# Patient Record
Sex: MALE | Race: OTHER | Employment: FULL TIME | URBAN - METROPOLITAN AREA
[De-identification: names, ages, dates, MRNs, and addresses within clinical notes are randomized per-mention and may not be internally consistent; named-entity substitution may affect disease eponyms.]

---

## 2017-11-09 ENCOUNTER — LAB ENCOUNTER (OUTPATIENT)
Dept: LAB | Age: 42
End: 2017-11-09
Attending: FAMILY MEDICINE
Payer: COMMERCIAL

## 2017-11-09 ENCOUNTER — HOSPITAL ENCOUNTER (OUTPATIENT)
Dept: GENERAL RADIOLOGY | Age: 42
Discharge: HOME | End: 2017-11-09
Attending: FAMILY MEDICINE
Payer: COMMERCIAL

## 2017-11-09 ENCOUNTER — OFFICE VISIT (OUTPATIENT)
Dept: FAMILY MEDICINE CLINIC | Facility: CLINIC | Age: 42
End: 2017-11-09

## 2017-11-09 VITALS
TEMPERATURE: 97 F | BODY MASS INDEX: 30 KG/M2 | WEIGHT: 196 LBS | HEART RATE: 81 BPM | SYSTOLIC BLOOD PRESSURE: 121 MMHG | DIASTOLIC BLOOD PRESSURE: 79 MMHG

## 2017-11-09 DIAGNOSIS — M54.50 CHRONIC BILATERAL LOW BACK PAIN WITHOUT SCIATICA: ICD-10-CM

## 2017-11-09 DIAGNOSIS — G89.29 CHRONIC BILATERAL LOW BACK PAIN WITHOUT SCIATICA: ICD-10-CM

## 2017-11-09 DIAGNOSIS — R10.13 EPIGASTRIC ABDOMINAL PAIN: Primary | ICD-10-CM

## 2017-11-09 DIAGNOSIS — R09.89 RALES: ICD-10-CM

## 2017-11-09 DIAGNOSIS — R19.7 DIARRHEA, UNSPECIFIED TYPE: ICD-10-CM

## 2017-11-09 DIAGNOSIS — R09.89 RHONCHI: ICD-10-CM

## 2017-11-09 DIAGNOSIS — L29.0 PRURITUS ANI: ICD-10-CM

## 2017-11-09 PROCEDURE — 99215 OFFICE O/P EST HI 40 MIN: CPT | Performed by: FAMILY MEDICINE

## 2017-11-09 PROCEDURE — 85652 RBC SED RATE AUTOMATED: CPT

## 2017-11-09 PROCEDURE — 99212 OFFICE O/P EST SF 10 MIN: CPT | Performed by: FAMILY MEDICINE

## 2017-11-09 PROCEDURE — 72110 X-RAY EXAM L-2 SPINE 4/>VWS: CPT | Performed by: FAMILY MEDICINE

## 2017-11-09 PROCEDURE — 80048 BASIC METABOLIC PNL TOTAL CA: CPT

## 2017-11-09 PROCEDURE — 85025 COMPLETE CBC W/AUTO DIFF WBC: CPT

## 2017-11-09 PROCEDURE — 36415 COLL VENOUS BLD VENIPUNCTURE: CPT

## 2017-11-09 RX ORDER — PANTOPRAZOLE SODIUM 40 MG/1
40 TABLET, DELAYED RELEASE ORAL
Qty: 90 TABLET | Refills: 1 | Status: SHIPPED | OUTPATIENT
Start: 2017-11-09 | End: 2018-11-09

## 2017-11-09 NOTE — PROGRESS NOTES
Patient ID: Sweetie Griffin is a 38year old male. HPI  Patient presents with:  Abdominal Pain: X 1 week  Diarrhea: X 2 weeks off and on     He has had epigastric pain for 1 week.   He states that given his wife some diclofenac and he was compla Readings from Last 6 Encounters:  11/09/17 : 121/79  11/01/16 : 113/76  02/01/16 : 132/82  01/02/15 : 120/58  12/22/14 : 122/80  12/12/14 : 123/82        Review of Systems   Constitutional: Negative for appetite change and unexpected weight change.    Respi Psychiatric: Patient has a normal mood and affect. He is actually coping quite well after losing his home. BACK: Has mild tenderness over the spinous processes from L3 down to L5 but no crepitus. No tenderness over the paraspinal muscles.     Deep ten CHEST (CPT=71020); Future        Follow up if symptoms persist.  Take medicine (if given) as prescribed. Approach to treatment discussed and patient/family member understands and agrees to plan.            Veda Santos DO  11/9/2017

## 2017-11-10 ENCOUNTER — HOSPITAL ENCOUNTER (OUTPATIENT)
Dept: GENERAL RADIOLOGY | Age: 42
Discharge: HOME | End: 2017-11-10
Attending: FAMILY MEDICINE
Payer: COMMERCIAL

## 2017-11-10 DIAGNOSIS — R09.89 RALES: ICD-10-CM

## 2017-11-10 DIAGNOSIS — R09.89 RHONCHI: ICD-10-CM

## 2017-11-10 PROCEDURE — 71020 XR CHEST PA + LAT CHEST (CPT=71020): CPT | Performed by: FAMILY MEDICINE

## 2017-12-20 ENCOUNTER — OFFICE VISIT (OUTPATIENT)
Dept: FAMILY MEDICINE CLINIC | Facility: CLINIC | Age: 42
End: 2017-12-20

## 2017-12-20 VITALS
HEIGHT: 68 IN | HEART RATE: 117 BPM | DIASTOLIC BLOOD PRESSURE: 68 MMHG | TEMPERATURE: 99 F | WEIGHT: 198 LBS | SYSTOLIC BLOOD PRESSURE: 126 MMHG | BODY MASS INDEX: 30.01 KG/M2

## 2017-12-20 DIAGNOSIS — J98.11 ATELECTASIS OF BOTH LUNGS: ICD-10-CM

## 2017-12-20 DIAGNOSIS — R05.9 COUGH: Primary | ICD-10-CM

## 2017-12-20 PROCEDURE — 99213 OFFICE O/P EST LOW 20 MIN: CPT | Performed by: FAMILY MEDICINE

## 2017-12-20 PROCEDURE — 99212 OFFICE O/P EST SF 10 MIN: CPT | Performed by: FAMILY MEDICINE

## 2017-12-20 RX ORDER — PREDNISONE 20 MG/1
TABLET ORAL
Qty: 10 TABLET | Refills: 0 | Status: SHIPPED | OUTPATIENT
Start: 2017-12-20 | End: 2018-11-27

## 2017-12-20 NOTE — PROGRESS NOTES
HPI:   Vicky Rodriguez is a 38year old male who presents for upper respiratory symptoms for  3  days. Patient reports congestion, cough with yellow colored sputum, headache. Also fevers and chills. Sweating a lot. Body aches.  Taking just robitus 2. Atelectasis of both lungs  Dr. Constantine Deshpande had ordered CT chest because same few months ago and CXR showed atelectasis - pt never went. Explained to pt should have it done. The patient indicates understanding of these issues and agrees to the plan.

## 2018-01-03 ENCOUNTER — HOSPITAL ENCOUNTER (OUTPATIENT)
Dept: CT IMAGING | Facility: HOSPITAL | Age: 43
Discharge: HOME | End: 2018-01-03
Attending: FAMILY MEDICINE
Payer: COMMERCIAL

## 2018-01-03 DIAGNOSIS — J98.11 ATELECTASIS OF BOTH LUNGS: ICD-10-CM

## 2018-01-03 DIAGNOSIS — R09.89 BILATERAL RALES: ICD-10-CM

## 2018-01-03 DIAGNOSIS — J98.4 SCARRING OF LUNG: ICD-10-CM

## 2018-01-03 DIAGNOSIS — R09.89 RHONCHI: ICD-10-CM

## 2018-01-03 PROCEDURE — 71250 CT THORAX DX C-: CPT | Performed by: FAMILY MEDICINE

## 2018-01-09 ENCOUNTER — TELEPHONE (OUTPATIENT)
Dept: OTHER | Age: 43
End: 2018-01-09

## 2018-01-09 NOTE — TELEPHONE ENCOUNTER
[place] call transferred by CSS: Patient informed of results (identified name and ) and recommendations, as per Dr. Toby Muir result note. Patient voiced understanding and agrees with recommendations. Pt will call to schedule testing; info provided.       Not

## 2018-01-11 ENCOUNTER — TELEPHONE (OUTPATIENT)
Dept: FAMILY MEDICINE CLINIC | Facility: CLINIC | Age: 43
End: 2018-01-11

## 2018-01-11 NOTE — TELEPHONE ENCOUNTER
----- Message from Katy South, DO sent at 1/3/2018 11:11 PM CST -----  Let them know he does have quite a bit of scarring in the lungs on both sides. I want him to do pulmonary function testing so we can see how is breathing is.

## 2018-11-27 ENCOUNTER — OFFICE VISIT (OUTPATIENT)
Dept: FAMILY MEDICINE CLINIC | Facility: CLINIC | Age: 43
End: 2018-11-27
Payer: COMMERCIAL

## 2018-11-27 VITALS
BODY MASS INDEX: 27.22 KG/M2 | WEIGHT: 179.63 LBS | TEMPERATURE: 97 F | HEIGHT: 68 IN | SYSTOLIC BLOOD PRESSURE: 110 MMHG | RESPIRATION RATE: 16 BRPM | HEART RATE: 68 BPM | DIASTOLIC BLOOD PRESSURE: 70 MMHG

## 2018-11-27 DIAGNOSIS — M54.50 ACUTE RIGHT-SIDED LOW BACK PAIN WITHOUT SCIATICA: Primary | ICD-10-CM

## 2018-11-27 DIAGNOSIS — S39.012A STRAIN OF LUMBAR REGION, INITIAL ENCOUNTER: ICD-10-CM

## 2018-11-27 PROCEDURE — 99213 OFFICE O/P EST LOW 20 MIN: CPT | Performed by: FAMILY MEDICINE

## 2018-11-27 PROCEDURE — 99212 OFFICE O/P EST SF 10 MIN: CPT | Performed by: FAMILY MEDICINE

## 2018-11-27 RX ORDER — NABUMETONE 750 MG/1
750 TABLET, FILM COATED ORAL 2 TIMES DAILY
Qty: 60 TABLET | Refills: 0 | Status: SHIPPED | OUTPATIENT
Start: 2018-11-27 | End: 2019-04-01

## 2018-11-27 RX ORDER — CYCLOBENZAPRINE HCL 10 MG
10 TABLET ORAL NIGHTLY
Qty: 20 TABLET | Refills: 0 | Status: SHIPPED | OUTPATIENT
Start: 2018-11-27 | End: 2018-12-17

## 2018-11-27 NOTE — PROGRESS NOTES
Patient ID: Matthew Zhou is a 37year old male.     HPI  Patient presents with:  Back Pain: lower back     Last night he was shoveling the snow and he put the scalpel in the snow and try to lift it and felt immediate pain just right of center in and well-nourished. No distress   Musculoskeletal:   Has tenderness over the lumbar paraspinal muscles from L2 down to L5 on the right side but also over the spinous processes in this area too.   He has decreased flexion at the waist and is only able to sherwin Approach to treatment discussed and patient/family member understands and agrees to plan. No Follow-up on file.       Aaliyah Mead,   11/27/2018

## 2018-11-27 NOTE — PATIENT INSTRUCTIONS
I see her back 2 or 3 times daily. Do stretches of your low back as well. Take the Relafen twice daily with food and then take the cyclobenzaprine at nighttime to help with sleep.

## 2019-04-01 ENCOUNTER — OFFICE VISIT (OUTPATIENT)
Dept: FAMILY MEDICINE CLINIC | Facility: CLINIC | Age: 44
End: 2019-04-01

## 2019-04-01 VITALS
BODY MASS INDEX: 27.58 KG/M2 | TEMPERATURE: 98 F | HEART RATE: 78 BPM | RESPIRATION RATE: 20 BRPM | DIASTOLIC BLOOD PRESSURE: 76 MMHG | HEIGHT: 68 IN | WEIGHT: 182 LBS | SYSTOLIC BLOOD PRESSURE: 117 MMHG

## 2019-04-01 DIAGNOSIS — M54.50 ACUTE RIGHT-SIDED LOW BACK PAIN WITHOUT SCIATICA: ICD-10-CM

## 2019-04-01 DIAGNOSIS — S39.012A STRAIN OF LUMBAR REGION, INITIAL ENCOUNTER: ICD-10-CM

## 2019-04-01 DIAGNOSIS — R09.89 BILATERAL RALES: ICD-10-CM

## 2019-04-01 DIAGNOSIS — J98.4 PULMONARY SCARRING: ICD-10-CM

## 2019-04-01 DIAGNOSIS — R93.89 ABNORMAL CT OF THE CHEST: ICD-10-CM

## 2019-04-01 DIAGNOSIS — Z00.00 ADULT GENERAL MEDICAL EXAM: Primary | ICD-10-CM

## 2019-04-01 PROCEDURE — 99396 PREV VISIT EST AGE 40-64: CPT | Performed by: FAMILY MEDICINE

## 2019-04-01 PROCEDURE — 99212 OFFICE O/P EST SF 10 MIN: CPT | Performed by: FAMILY MEDICINE

## 2019-04-01 RX ORDER — NABUMETONE 750 MG/1
750 TABLET, FILM COATED ORAL 2 TIMES DAILY
Qty: 60 TABLET | Refills: 0 | Status: SHIPPED | OUTPATIENT
Start: 2019-04-01 | End: 2019-04-28

## 2019-04-01 NOTE — PROGRESS NOTES
Patient ID: Imer Cates is a 37year old male. HPI  Patient presents with:  Routine Physical: Patient present for physical  He smokes rarely with friends. He drives a forklift for work. Is .      He does not have allergies, no anxiety TP 7.1 11/02/2016    ALB 4.2 11/02/2016    GLOBULIN 2.9 11/02/2016    AGRATIO 1.4 09/12/2014     11/09/2017    K 3.7 11/09/2017     11/09/2017    CO2 26 11/09/2017       Lab Results   Component Value Date    GLU 92 11/09/2017    BUN 16 11/09/20 results found for: VITD, QVITD, VITD25, QZYX74LZ  No results found for: PSA, QPSA, TOTPSASCREEN    =======================================================    Wt Readings from Last 6 Encounters:  04/01/19 : 182 lb (82.6 kg)  11/27/18 : 179 lb 9.6 oz (81.5 k Day Smoker        Types: Cigarettes      Smokeless tobacco: Never Used    Substance and Sexual Activity      Alcohol use:  Yes        Alcohol/week: 2.4 oz        Types: 4 Cans of beer per week        Comment: Weekends      Drug use: No      Sexual activity: Head: Normocephalic.    Right Ear: Tympanic membrane and ear canal normal.   Left Ear: Tympanic membrane and ear canal normal.   Mouth/Throat: Oropharynx is clear and moist and mucous membranes are normal.   Eyes: Conjunctivae and EOM are normal. Pupils a Marissa Jiménez, 4/1/2019, 5:48 PM.    I, Anand Jameson DO,  personally performed the services described in this documentation. All medical record entries made by the scribe were at my direction and in my presence.   I have reviewed the chart and discharge instruct

## 2019-04-03 ENCOUNTER — LAB ENCOUNTER (OUTPATIENT)
Dept: LAB | Age: 44
End: 2019-04-03
Attending: FAMILY MEDICINE
Payer: COMMERCIAL

## 2019-04-03 DIAGNOSIS — Z00.00 ADULT GENERAL MEDICAL EXAM: ICD-10-CM

## 2019-04-03 PROCEDURE — 85025 COMPLETE CBC W/AUTO DIFF WBC: CPT

## 2019-04-03 PROCEDURE — 36415 COLL VENOUS BLD VENIPUNCTURE: CPT

## 2019-04-03 PROCEDURE — 80053 COMPREHEN METABOLIC PANEL: CPT

## 2019-04-03 PROCEDURE — 84443 ASSAY THYROID STIM HORMONE: CPT

## 2019-04-03 PROCEDURE — 83721 ASSAY OF BLOOD LIPOPROTEIN: CPT

## 2019-04-03 PROCEDURE — 80061 LIPID PANEL: CPT

## 2019-04-24 ENCOUNTER — HOSPITAL ENCOUNTER (OUTPATIENT)
Dept: RESPIRATORY THERAPY | Facility: HOSPITAL | Age: 44
Discharge: HOME | End: 2019-04-24
Attending: FAMILY MEDICINE
Payer: COMMERCIAL

## 2019-04-24 DIAGNOSIS — R09.89 BILATERAL RALES: ICD-10-CM

## 2019-04-24 DIAGNOSIS — J98.4 PULMONARY SCARRING: ICD-10-CM

## 2019-04-24 DIAGNOSIS — R93.89 ABNORMAL CT OF THE CHEST: ICD-10-CM

## 2019-04-24 PROCEDURE — 94729 DIFFUSING CAPACITY: CPT | Performed by: INTERNAL MEDICINE

## 2019-04-24 PROCEDURE — 94726 PLETHYSMOGRAPHY LUNG VOLUMES: CPT | Performed by: INTERNAL MEDICINE

## 2019-04-24 PROCEDURE — 94060 EVALUATION OF WHEEZING: CPT | Performed by: INTERNAL MEDICINE

## 2019-04-28 DIAGNOSIS — M54.50 ACUTE RIGHT-SIDED LOW BACK PAIN WITHOUT SCIATICA: ICD-10-CM

## 2019-04-28 DIAGNOSIS — S39.012A STRAIN OF LUMBAR REGION, INITIAL ENCOUNTER: ICD-10-CM

## 2019-04-29 RX ORDER — NABUMETONE 750 MG/1
750 TABLET, FILM COATED ORAL 2 TIMES DAILY
Qty: 180 TABLET | Refills: 1 | Status: SHIPPED | OUTPATIENT
Start: 2019-04-29 | End: 2020-10-23

## 2019-04-29 NOTE — TELEPHONE ENCOUNTER
Refill passed per [facility] protocol.   Refill Protocol Appointment Criteria  · Appointment scheduled in the past 6 months or in the next 3 months  Recent Outpatient Visits            4 weeks ago Adult general medical exam    [facility], [place]

## 2019-05-09 NOTE — ADDENDUM NOTE
Encounter addended by: Patricia Hathaway RCP on: 5/8/2019 9:52 PM   Actions taken: Sign clinical note

## 2019-05-09 NOTE — PROCEDURES
Pulmonary Function Test     [facility] Patient Status:  Outpatient    10/2/1975 MRN [id number]   Date of Exam 19 PCP Anand Jameson DO           Spirometry   FEV1:3.43 102%  FVC:4.13 100%  FEV1/FVC:0.83    Lung Volume   T.33 90%

## 2019-12-09 ENCOUNTER — NURSE TRIAGE (OUTPATIENT)
Dept: FAMILY MEDICINE CLINIC | Facility: CLINIC | Age: 44
End: 2019-12-09

## 2019-12-09 NOTE — TELEPHONE ENCOUNTER
Action Requested: Summary for Provider     []  Critical Lab, Recommendations Needed  [x] Need Additional Advice  []   FYI    []   Need Orders  [] Need Medications Sent to Pharmacy  []  Other     SUMMARY: advised ER due for scrotal pain x 3 weeks.   Pt askin

## 2019-12-10 NOTE — TELEPHONE ENCOUNTER
Spoke to patient and he is unable to come into office tomorrow due to work schedule. Per patient he just started a new job and needs an appointment after 3:00 p.m. Patient requesting appointment this week after 3:00 p.m.    Patient states he is not havi

## 2019-12-10 NOTE — TELEPHONE ENCOUNTER
He can see me tomorrow before 11:30 AM as I have a meeting at noon. If his pain is severe however then he needs to go to the emergency room.

## 2019-12-12 ENCOUNTER — OFFICE VISIT (OUTPATIENT)
Dept: FAMILY MEDICINE CLINIC | Facility: CLINIC | Age: 44
End: 2019-12-12

## 2019-12-12 ENCOUNTER — APPOINTMENT (OUTPATIENT)
Dept: LAB | Age: 44
End: 2019-12-12
Attending: FAMILY MEDICINE
Payer: COMMERCIAL

## 2019-12-12 VITALS
HEART RATE: 68 BPM | TEMPERATURE: 98 F | BODY MASS INDEX: 27.89 KG/M2 | SYSTOLIC BLOOD PRESSURE: 125 MMHG | WEIGHT: 184 LBS | HEIGHT: 68 IN | DIASTOLIC BLOOD PRESSURE: 78 MMHG

## 2019-12-12 DIAGNOSIS — E78.1 PURE HYPERTRIGLYCERIDEMIA: ICD-10-CM

## 2019-12-12 DIAGNOSIS — R10.31 RIGHT GROIN PAIN: Primary | ICD-10-CM

## 2019-12-12 DIAGNOSIS — R10.31 RIGHT GROIN PAIN: ICD-10-CM

## 2019-12-12 PROCEDURE — 81001 URINALYSIS AUTO W/SCOPE: CPT

## 2019-12-12 PROCEDURE — 76857 US EXAM PELVIC LIMITED: CPT | Performed by: FAMILY MEDICINE

## 2019-12-12 PROCEDURE — 99214 OFFICE O/P EST MOD 30 MIN: CPT | Performed by: FAMILY MEDICINE

## 2019-12-12 NOTE — PROGRESS NOTES
Patient ID: J Luis Elizabeth is a 53year old male. HPI  Patient presents with: Other: right inguinal pain    Telephone message from 12/9/19:  SUMMARY: advised ER due for scrotal pain x 3 weeks.   Pt asking for advice first from Harriet Donovan 09/12/2014    LYMPH 2.7 09/12/2014    MON 0.6 09/12/2014    EOS 0.2 09/12/2014    BASO 0.1 09/12/2014    NEPERCENT 44.9 04/03/2019    LYPERCENT 41.6 04/03/2019    MOPERCENT 8.0 04/03/2019    EOPERCENT 4.3 04/03/2019    BAPERCENT 0.8 04/03/2019    NE 3.23 0 : 184 lb (83.5 kg)  04/01/19 : 182 lb (82.6 kg)  11/27/18 : 179 lb 9.6 oz (81.5 kg)  12/20/17 : 198 lb (89.8 kg)  11/09/17 : 196 lb (88.9 kg)  11/01/16 : 192 lb (87.1 kg)      BMI Readings from Last 6 Encounters:  12/12/19 : 27.98 kg/m²  04/01/19 : 27.67 k negative bilaterally. No lower back tenderness. Normal patellar reflexes. Exam of the penis There is no erythema and there is no discharge from the meatus. Testes are descended bilaterally and there are no masses or swelling.   There are no inguinal herni

## 2019-12-21 ENCOUNTER — APPOINTMENT (OUTPATIENT)
Dept: LAB | Age: 44
End: 2019-12-21
Attending: FAMILY MEDICINE
Payer: COMMERCIAL

## 2019-12-21 DIAGNOSIS — E78.1 PURE HYPERTRIGLYCERIDEMIA: ICD-10-CM

## 2019-12-21 PROCEDURE — 80061 LIPID PANEL: CPT

## 2019-12-21 PROCEDURE — 36415 COLL VENOUS BLD VENIPUNCTURE: CPT

## 2019-12-23 ENCOUNTER — TELEPHONE (OUTPATIENT)
Dept: FAMILY MEDICINE CLINIC | Facility: CLINIC | Age: 44
End: 2019-12-23

## 2019-12-23 NOTE — TELEPHONE ENCOUNTER
Patient returned call spoke with patient via [address] [place] [address]  ID# [id number] (Name and  of pt verified). All results and recommendations reviewed. Patient verbalizes understanding, denies further questions and agrees with plan of care.       Notes recorde

## 2020-01-09 ENCOUNTER — HOSPITAL ENCOUNTER (OUTPATIENT)
Dept: ULTRASOUND IMAGING | Facility: HOSPITAL | Age: 45
Discharge: HOME | End: 2020-01-09
Attending: FAMILY MEDICINE
Payer: COMMERCIAL

## 2020-01-09 DIAGNOSIS — R10.31 RIGHT GROIN PAIN: ICD-10-CM

## 2020-01-09 PROCEDURE — 76882 US LMTD JT/FCL EVL NVASC XTR: CPT | Performed by: FAMILY MEDICINE

## 2020-01-13 ENCOUNTER — OFFICE VISIT (OUTPATIENT)
Dept: FAMILY MEDICINE CLINIC | Facility: CLINIC | Age: 45
End: 2020-01-13

## 2020-01-13 VITALS
WEIGHT: 188 LBS | HEIGHT: 68 IN | SYSTOLIC BLOOD PRESSURE: 129 MMHG | DIASTOLIC BLOOD PRESSURE: 81 MMHG | HEART RATE: 82 BPM | BODY MASS INDEX: 28.49 KG/M2 | TEMPERATURE: 97 F

## 2020-01-13 DIAGNOSIS — S76.211D GROIN STRAIN, RIGHT, SUBSEQUENT ENCOUNTER: Primary | ICD-10-CM

## 2020-01-13 DIAGNOSIS — J06.9 UPPER RESPIRATORY TRACT INFECTION, UNSPECIFIED TYPE: ICD-10-CM

## 2020-01-13 DIAGNOSIS — Z23 NEED FOR VACCINATION: ICD-10-CM

## 2020-01-13 DIAGNOSIS — K40.20 BILATERAL INGUINAL HERNIA WITHOUT OBSTRUCTION OR GANGRENE, RECURRENCE NOT SPECIFIED: ICD-10-CM

## 2020-01-13 PROCEDURE — 90686 IIV4 VACC NO PRSV 0.5 ML IM: CPT | Performed by: FAMILY MEDICINE

## 2020-01-13 PROCEDURE — 99214 OFFICE O/P EST MOD 30 MIN: CPT | Performed by: FAMILY MEDICINE

## 2020-01-13 PROCEDURE — 90471 IMMUNIZATION ADMIN: CPT | Performed by: FAMILY MEDICINE

## 2020-01-13 RX ORDER — AZELASTINE 1 MG/ML
2 SPRAY, METERED NASAL 2 TIMES DAILY
Qty: 1 BOTTLE | Refills: 0 | Status: SHIPPED | OUTPATIENT
Start: 2020-01-13 | End: 2020-10-23

## 2020-01-13 NOTE — PATIENT INSTRUCTIONS
Go ahead and do the Advil Cold and Sinus as directed. I gave you Astelin nasal spray. Do 2 sprays in each nostril at dinner and also after breakfast.  Once you feel better you can stop and keep it for the next time he gets sick.

## 2020-01-13 NOTE — PROGRESS NOTES
Patient ID: Areli Winslow is a 53year old male. HPI  Patient presents with:  Test Results: discuss US results  Nasal Congestion    Last seen by me on 12/12/19. Present today with his wife. He has not had right groin pain for 2 weeks now. 129/81  12/12/19 : 125/78  04/01/19 : 117/76  11/27/18 : 110/70  12/20/17 : 126/68  11/09/17 : 121/79        Review of Systems   Constitutional: Negative for appetite change, chills and fever. HENT: Positive for congestion and rhinorrhea.  Negative for sn Normal mood and affect. Exam of the penis is normal.  There is no erythema and there is no discharge from the meatus. Testes are descended bilaterally and there are no masses or swelling.   There are no inguinal hernias bilaterally and no inguinal adenopa DO Gisell. Electronically Signed: Giselle Cueto, 1/13/2020, 4:45 PM.    IAnand DO,  personally performed the services described in this documentation. All medical record entries made by the scribe were at my direction and in my presence.   I

## 2020-10-23 ENCOUNTER — OFFICE VISIT (OUTPATIENT)
Dept: FAMILY MEDICINE CLINIC | Facility: CLINIC | Age: 45
End: 2020-10-23

## 2020-10-23 VITALS
BODY MASS INDEX: 29.58 KG/M2 | HEIGHT: 68 IN | HEART RATE: 78 BPM | DIASTOLIC BLOOD PRESSURE: 80 MMHG | WEIGHT: 195.19 LBS | SYSTOLIC BLOOD PRESSURE: 110 MMHG | TEMPERATURE: 98 F

## 2020-10-23 DIAGNOSIS — S39.012A STRAIN OF LUMBAR REGION, INITIAL ENCOUNTER: ICD-10-CM

## 2020-10-23 DIAGNOSIS — Z23 NEED FOR VACCINATION: ICD-10-CM

## 2020-10-23 DIAGNOSIS — E78.1 PURE HYPERTRIGLYCERIDEMIA: ICD-10-CM

## 2020-10-23 DIAGNOSIS — Z00.00 ADULT GENERAL MEDICAL EXAM: Primary | ICD-10-CM

## 2020-10-23 DIAGNOSIS — L29.0 ANAL PRURITUS: ICD-10-CM

## 2020-10-23 DIAGNOSIS — M54.50 ACUTE RIGHT-SIDED LOW BACK PAIN WITHOUT SCIATICA: ICD-10-CM

## 2020-10-23 PROCEDURE — 90471 IMMUNIZATION ADMIN: CPT | Performed by: FAMILY MEDICINE

## 2020-10-23 PROCEDURE — 3074F SYST BP LT 130 MM HG: CPT | Performed by: FAMILY MEDICINE

## 2020-10-23 PROCEDURE — 90686 IIV4 VACC NO PRSV 0.5 ML IM: CPT | Performed by: FAMILY MEDICINE

## 2020-10-23 PROCEDURE — 99396 PREV VISIT EST AGE 40-64: CPT | Performed by: FAMILY MEDICINE

## 2020-10-23 PROCEDURE — 3079F DIAST BP 80-89 MM HG: CPT | Performed by: FAMILY MEDICINE

## 2020-10-23 PROCEDURE — 3008F BODY MASS INDEX DOCD: CPT | Performed by: FAMILY MEDICINE

## 2020-10-23 RX ORDER — NABUMETONE 750 MG/1
750 TABLET, FILM COATED ORAL 2 TIMES DAILY
Qty: 180 TABLET | Refills: 1 | Status: SHIPPED | OUTPATIENT
Start: 2020-10-23 | End: 2022-01-25

## 2020-10-23 NOTE — PROGRESS NOTES
Patient ID: Niles Galicia is a 50year old male. HPI  Patient presents with:  Physical    Last physical on 4/1/2019. 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     The patient denies any CP, SOB, blood in TP 7.7 04/03/2019    ALB 4.1 04/03/2019    GLOBULIN 3.6 04/03/2019    AGRATIO 1.4 09/12/2014     04/03/2019    K 3.8 04/03/2019     04/03/2019    CO2 28.0 04/03/2019       Lab Results   Component Value Date    GLU 93 04/03/2019    BUN 17 04/03/ 110/80  01/13/20 : 129/81  12/12/19 : 125/78  04/01/19 : 117/76  11/27/18 : 110/70  12/20/17 : 126/68        Review of Systems   Constitutional: Negative for chills and fever. HENT: Negative for voice change.     Respiratory: Negative for chest tightness file        Active member of club or organization: Not on file        Attends meetings of clubs or organizations: Not on file        Relationship status: Not on file      Intimate partner violence        Fear of current or ex partner: Not on file        Em rhythm and no murmur heard. Pulmonary/Chest: Effort normal and breath sounds normal. No respiratory distress. Abdominal: Soft. Bowel sounds are normal. There is no hepatosplenomegaly. There is no tenderness.    Lymphadenopathy: He has no cervical adenop Take medicine (if given) as prescribed. Approach to treatment discussed and patient/family member understands and agrees to plan. No follow-ups on file. There are no Patient Instructions on file for this visit.     Fauzia Ramos    10/23/2020    By

## 2020-10-24 ENCOUNTER — LAB ENCOUNTER (OUTPATIENT)
Dept: LAB | Age: 45
End: 2020-10-24
Attending: FAMILY MEDICINE
Payer: COMMERCIAL

## 2020-10-24 DIAGNOSIS — Z00.00 ADULT GENERAL MEDICAL EXAM: ICD-10-CM

## 2020-10-24 PROCEDURE — 82306 VITAMIN D 25 HYDROXY: CPT

## 2020-10-24 PROCEDURE — 83721 ASSAY OF BLOOD LIPOPROTEIN: CPT

## 2020-10-24 PROCEDURE — 36415 COLL VENOUS BLD VENIPUNCTURE: CPT

## 2020-10-24 PROCEDURE — 84443 ASSAY THYROID STIM HORMONE: CPT

## 2020-10-24 PROCEDURE — 85025 COMPLETE CBC W/AUTO DIFF WBC: CPT

## 2020-10-24 PROCEDURE — 80053 COMPREHEN METABOLIC PANEL: CPT

## 2020-10-24 PROCEDURE — 80061 LIPID PANEL: CPT

## 2020-10-29 ENCOUNTER — TELEPHONE (OUTPATIENT)
Dept: FAMILY MEDICINE CLINIC | Facility: CLINIC | Age: 45
End: 2020-10-29

## 2020-10-29 DIAGNOSIS — E78.2 MIXED HYPERLIPIDEMIA: Primary | ICD-10-CM

## 2020-10-29 DIAGNOSIS — E78.5 HYPERLIPIDEMIA, UNSPECIFIED HYPERLIPIDEMIA TYPE: ICD-10-CM

## 2020-10-29 NOTE — TELEPHONE ENCOUNTER
Patient notified of results below, he verbalized understanding and agrees with plan.  Order has been placed per Dr Lucía Wesley request.     Erika Zhu DO   10/28/2020  5:42 PM      Vitamin D is low.  Let us start 50,000 units of vitamin D weekly for the n

## 2020-12-10 ENCOUNTER — HOSPITAL ENCOUNTER (OUTPATIENT)
Age: 45
Discharge: HOME | End: 2020-12-10
Payer: COMMERCIAL

## 2020-12-10 VITALS
DIASTOLIC BLOOD PRESSURE: 72 MMHG | RESPIRATION RATE: 16 BRPM | OXYGEN SATURATION: 97 % | WEIGHT: 192 LBS | TEMPERATURE: 98 F | BODY MASS INDEX: 30.86 KG/M2 | HEART RATE: 95 BPM | SYSTOLIC BLOOD PRESSURE: 117 MMHG | HEIGHT: 66 IN

## 2020-12-10 DIAGNOSIS — Z20.822 EXPOSURE TO COVID-19 VIRUS: ICD-10-CM

## 2020-12-10 DIAGNOSIS — Z20.822 ENCOUNTER FOR SCREENING LABORATORY TESTING FOR COVID-19 VIRUS: Primary | ICD-10-CM

## 2020-12-10 PROCEDURE — 99213 OFFICE O/P EST LOW 20 MIN: CPT | Performed by: NURSE PRACTITIONER

## 2020-12-11 NOTE — ED PROVIDER NOTES
Patient Seen in: [facility]      History   Patient presents with:  Testing    Stated Complaint: Test; exposure    HPI    This is a 17-year-old male Lao-speaking but understands English presenting for Covid testing, asymptomatic, exposure effort is normal.      Breath sounds: Normal breath sounds. Abdominal:      Palpations: Abdomen is soft. Musculoskeletal: Normal range of motion. Skin:     General: Skin is warm and dry.       Capillary Refill: Capillary refill takes less than 2 secon

## 2022-01-25 ENCOUNTER — OFFICE VISIT (OUTPATIENT)
Dept: FAMILY MEDICINE CLINIC | Facility: CLINIC | Age: 47
End: 2022-01-25
Payer: COMMERCIAL

## 2022-01-25 VITALS
WEIGHT: 199.38 LBS | HEIGHT: 66 IN | SYSTOLIC BLOOD PRESSURE: 121 MMHG | HEART RATE: 76 BPM | BODY MASS INDEX: 32.04 KG/M2 | TEMPERATURE: 97 F | DIASTOLIC BLOOD PRESSURE: 74 MMHG

## 2022-01-25 DIAGNOSIS — Z12.11 SCREENING FOR COLON CANCER: ICD-10-CM

## 2022-01-25 DIAGNOSIS — Z23 NEED FOR VACCINATION: ICD-10-CM

## 2022-01-25 DIAGNOSIS — E55.9 VITAMIN D DEFICIENCY: ICD-10-CM

## 2022-01-25 DIAGNOSIS — Z00.00 ADULT GENERAL MEDICAL EXAM: Primary | ICD-10-CM

## 2022-01-25 PROCEDURE — 90471 IMMUNIZATION ADMIN: CPT | Performed by: FAMILY MEDICINE

## 2022-01-25 PROCEDURE — 3078F DIAST BP <80 MM HG: CPT | Performed by: FAMILY MEDICINE

## 2022-01-25 PROCEDURE — 3074F SYST BP LT 130 MM HG: CPT | Performed by: FAMILY MEDICINE

## 2022-01-25 PROCEDURE — 90686 IIV4 VACC NO PRSV 0.5 ML IM: CPT | Performed by: FAMILY MEDICINE

## 2022-01-25 PROCEDURE — 99396 PREV VISIT EST AGE 40-64: CPT | Performed by: FAMILY MEDICINE

## 2022-01-25 PROCEDURE — 3008F BODY MASS INDEX DOCD: CPT | Performed by: FAMILY MEDICINE

## 2022-01-25 NOTE — PROGRESS NOTES
Patient ID: Christine Jackson is a 48year old male. HPI  Patient presents with:  Physical    Last physical on 10/23/2020. 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 He smokes infrequently and is .      Pt is due AGRATIO 1.4 09/12/2014     10/24/2020    K 3.8 10/24/2020     10/24/2020    CO2 29.0 10/24/2020       Lab Results   Component Value Date    GLU 83 10/24/2020    BUN 22 (H) 10/24/2020    CREATSERUM 0.85 10/24/2020    BUNCREA 25.9 (H) 10/24/20 30.99 kg/m²  10/23/20 : 29.68 kg/m²  01/13/20 : 28.59 kg/m²  12/12/19 : 27.98 kg/m²  04/01/19 : 27.67 kg/m²      BP Readings from Last 6 Encounters:  01/25/22 : 121/74  12/10/20 : 117/72  10/23/20 : 110/80  01/13/20 : 129/81  12/12/19 : 125/78  04/01/19 : Violence: Not on file  Housing Stability: Not on file       No current outpatient medications on file. Allergies:No Known Allergies   PHYSICAL EXAM:   Physical Exam    Physical Exam   Constitutional: He appears well-developed and well-nourished.  No dis Order Comments:               You will call a specific number and speak to a nurse to see if you are candidate to get the colonoscopy done at the hospital without having to see a physician first.          Referral Priority:Routine          Referral Type:Pr

## 2022-01-29 ENCOUNTER — LAB ENCOUNTER (OUTPATIENT)
Dept: LAB | Age: 47
End: 2022-01-29
Attending: FAMILY MEDICINE
Payer: COMMERCIAL

## 2022-01-29 DIAGNOSIS — E55.9 VITAMIN D DEFICIENCY: ICD-10-CM

## 2022-01-29 DIAGNOSIS — Z00.00 ADULT GENERAL MEDICAL EXAM: ICD-10-CM

## 2022-01-29 LAB
ALBUMIN SERPL-MCNC: 3.8 G/DL (ref 3.4–5)
ALBUMIN/GLOB SERPL: 1 {RATIO} (ref 1–2)
ALP LIVER SERPL-CCNC: 82 U/L
ALT SERPL-CCNC: 79 U/L
ANION GAP SERPL CALC-SCNC: 6 MMOL/L (ref 0–18)
AST SERPL-CCNC: 40 U/L (ref 15–37)
BASOPHILS # BLD AUTO: 0.06 X10(3) UL (ref 0–0.2)
BASOPHILS NFR BLD AUTO: 0.8 %
BILIRUB SERPL-MCNC: 0.5 MG/DL (ref 0.1–2)
BUN BLD-MCNC: 15 MG/DL (ref 7–18)
BUN/CREAT SERPL: 19.2 (ref 10–20)
CALCIUM BLD-MCNC: 9.5 MG/DL (ref 8.5–10.1)
CHLORIDE SERPL-SCNC: 106 MMOL/L (ref 98–112)
CHOLEST SERPL-MCNC: 142 MG/DL (ref ?–200)
CO2 SERPL-SCNC: 29 MMOL/L (ref 21–32)
CREAT BLD-MCNC: 0.78 MG/DL
DEPRECATED RDW RBC AUTO: 42.5 FL (ref 35.1–46.3)
EOSINOPHIL # BLD AUTO: 0.21 X10(3) UL (ref 0–0.7)
EOSINOPHIL NFR BLD AUTO: 2.7 %
ERYTHROCYTE [DISTWIDTH] IN BLOOD BY AUTOMATED COUNT: 12.4 % (ref 11–15)
FASTING PATIENT LIPID ANSWER: YES
FASTING STATUS PATIENT QL REPORTED: YES
GLOBULIN PLAS-MCNC: 3.8 G/DL (ref 2.8–4.4)
GLUCOSE BLD-MCNC: 101 MG/DL (ref 70–99)
HCT VFR BLD AUTO: 48.3 %
HDLC SERPL-MCNC: 27 MG/DL (ref 40–59)
HGB BLD-MCNC: 15.6 G/DL
IMM GRANULOCYTES # BLD AUTO: 0.03 X10(3) UL (ref 0–1)
IMM GRANULOCYTES NFR BLD: 0.4 %
LDLC SERPL CALC-MCNC: 76 MG/DL (ref ?–100)
LYMPHOCYTES # BLD AUTO: 3.31 X10(3) UL (ref 1–4)
LYMPHOCYTES NFR BLD AUTO: 42.4 %
MCH RBC QN AUTO: 30 PG (ref 26–34)
MCHC RBC AUTO-ENTMCNC: 32.3 G/DL (ref 31–37)
MCV RBC AUTO: 92.9 FL
MONOCYTES # BLD AUTO: 0.58 X10(3) UL (ref 0.1–1)
MONOCYTES NFR BLD AUTO: 7.4 %
NEUTROPHILS # BLD AUTO: 3.61 X10 (3) UL (ref 1.5–7.7)
NEUTROPHILS # BLD AUTO: 3.61 X10(3) UL (ref 1.5–7.7)
NEUTROPHILS NFR BLD AUTO: 46.3 %
NONHDLC SERPL-MCNC: 115 MG/DL (ref ?–130)
OSMOLALITY SERPL CALC.SUM OF ELEC: 293 MOSM/KG (ref 275–295)
PLATELET # BLD AUTO: 254 10(3)UL (ref 150–450)
POTASSIUM SERPL-SCNC: 4.3 MMOL/L (ref 3.5–5.1)
PROT SERPL-MCNC: 7.6 G/DL (ref 6.4–8.2)
RBC # BLD AUTO: 5.2 X10(6)UL
SODIUM SERPL-SCNC: 141 MMOL/L (ref 136–145)
TRIGL SERPL-MCNC: 236 MG/DL (ref 30–149)
TSI SER-ACNC: 0.81 MIU/ML (ref 0.36–3.74)
VIT D+METAB SERPL-MCNC: 9.4 NG/ML (ref 30–100)
VLDLC SERPL CALC-MCNC: 37 MG/DL (ref 0–30)
WBC # BLD AUTO: 7.8 X10(3) UL (ref 4–11)

## 2022-01-29 PROCEDURE — 80061 LIPID PANEL: CPT

## 2022-01-29 PROCEDURE — 85025 COMPLETE CBC W/AUTO DIFF WBC: CPT

## 2022-01-29 PROCEDURE — 84443 ASSAY THYROID STIM HORMONE: CPT

## 2022-01-29 PROCEDURE — 36415 COLL VENOUS BLD VENIPUNCTURE: CPT

## 2022-01-29 PROCEDURE — 82306 VITAMIN D 25 HYDROXY: CPT

## 2022-01-29 PROCEDURE — 80053 COMPREHEN METABOLIC PANEL: CPT

## 2022-02-08 ENCOUNTER — TELEPHONE (OUTPATIENT)
Dept: FAMILY MEDICINE CLINIC | Facility: CLINIC | Age: 47
End: 2022-02-08

## 2022-02-28 ENCOUNTER — OFFICE VISIT (OUTPATIENT)
Dept: FAMILY MEDICINE CLINIC | Facility: CLINIC | Age: 47
End: 2022-02-28
Payer: COMMERCIAL

## 2022-02-28 VITALS
DIASTOLIC BLOOD PRESSURE: 69 MMHG | SYSTOLIC BLOOD PRESSURE: 107 MMHG | BODY MASS INDEX: 31.98 KG/M2 | HEART RATE: 76 BPM | HEIGHT: 66 IN | WEIGHT: 199 LBS

## 2022-02-28 DIAGNOSIS — L29.0 ANAL PRURITUS: ICD-10-CM

## 2022-02-28 DIAGNOSIS — L30.9 DERMATITIS: ICD-10-CM

## 2022-02-28 DIAGNOSIS — T78.40XA ALLERGIC REACTION, INITIAL ENCOUNTER: Primary | ICD-10-CM

## 2022-02-28 PROCEDURE — 3008F BODY MASS INDEX DOCD: CPT | Performed by: NURSE PRACTITIONER

## 2022-02-28 PROCEDURE — 3078F DIAST BP <80 MM HG: CPT | Performed by: NURSE PRACTITIONER

## 2022-02-28 PROCEDURE — 99213 OFFICE O/P EST LOW 20 MIN: CPT | Performed by: NURSE PRACTITIONER

## 2022-02-28 PROCEDURE — 3074F SYST BP LT 130 MM HG: CPT | Performed by: NURSE PRACTITIONER

## 2022-02-28 RX ORDER — HYDROXYZINE HYDROCHLORIDE 25 MG/1
25 TABLET, FILM COATED ORAL EVERY 8 HOURS PRN
Qty: 30 TABLET | Refills: 0 | Status: SHIPPED | OUTPATIENT
Start: 2022-02-28

## 2022-02-28 RX ORDER — LEVOCETIRIZINE DIHYDROCHLORIDE 5 MG/1
5 TABLET, FILM COATED ORAL EVERY EVENING
Qty: 90 TABLET | Refills: 1 | Status: SHIPPED | OUTPATIENT
Start: 2022-02-28

## 2022-02-28 RX ORDER — PREDNISONE 20 MG/1
TABLET ORAL
Qty: 10 TABLET | Refills: 0 | Status: SHIPPED | OUTPATIENT
Start: 2022-02-28

## 2022-03-04 ENCOUNTER — LAB ENCOUNTER (OUTPATIENT)
Dept: LAB | Age: 47
End: 2022-03-04
Attending: FAMILY MEDICINE
Payer: COMMERCIAL

## 2022-03-04 DIAGNOSIS — R79.89 ELEVATED LIVER FUNCTION TESTS: ICD-10-CM

## 2022-03-04 LAB
ALT SERPL-CCNC: 62 U/L
AST SERPL-CCNC: 42 U/L (ref 15–37)

## 2022-03-04 PROCEDURE — 84460 ALANINE AMINO (ALT) (SGPT): CPT

## 2022-03-04 PROCEDURE — 36415 COLL VENOUS BLD VENIPUNCTURE: CPT

## 2022-03-04 PROCEDURE — 84450 TRANSFERASE (AST) (SGOT): CPT

## 2022-04-11 ENCOUNTER — LAB ENCOUNTER (OUTPATIENT)
Dept: LAB | Age: 47
End: 2022-04-11
Attending: FAMILY MEDICINE
Payer: COMMERCIAL

## 2022-04-11 ENCOUNTER — OFFICE VISIT (OUTPATIENT)
Dept: FAMILY MEDICINE CLINIC | Facility: CLINIC | Age: 47
End: 2022-04-11
Payer: COMMERCIAL

## 2022-04-11 VITALS
HEART RATE: 67 BPM | HEIGHT: 66 IN | DIASTOLIC BLOOD PRESSURE: 84 MMHG | WEIGHT: 198.38 LBS | BODY MASS INDEX: 31.88 KG/M2 | TEMPERATURE: 98 F | SYSTOLIC BLOOD PRESSURE: 134 MMHG

## 2022-04-11 DIAGNOSIS — L50.9 URTICARIAL RASH: Primary | ICD-10-CM

## 2022-04-11 DIAGNOSIS — R79.89 ELEVATED LIVER FUNCTION TESTS: ICD-10-CM

## 2022-04-11 DIAGNOSIS — L50.9 URTICARIAL RASH: ICD-10-CM

## 2022-04-11 LAB
ALT SERPL-CCNC: 52 U/L
AST SERPL-CCNC: 25 U/L (ref 15–37)

## 2022-04-11 PROCEDURE — 3079F DIAST BP 80-89 MM HG: CPT | Performed by: FAMILY MEDICINE

## 2022-04-11 PROCEDURE — 82785 ASSAY OF IGE: CPT

## 2022-04-11 PROCEDURE — 84460 ALANINE AMINO (ALT) (SGPT): CPT

## 2022-04-11 PROCEDURE — 3008F BODY MASS INDEX DOCD: CPT | Performed by: FAMILY MEDICINE

## 2022-04-11 PROCEDURE — 3075F SYST BP GE 130 - 139MM HG: CPT | Performed by: FAMILY MEDICINE

## 2022-04-11 PROCEDURE — 86003 ALLG SPEC IGE CRUDE XTRC EA: CPT

## 2022-04-11 PROCEDURE — 36415 COLL VENOUS BLD VENIPUNCTURE: CPT

## 2022-04-11 PROCEDURE — 84450 TRANSFERASE (AST) (SGOT): CPT

## 2022-04-11 PROCEDURE — 99214 OFFICE O/P EST MOD 30 MIN: CPT | Performed by: FAMILY MEDICINE

## 2022-04-11 RX ORDER — FAMOTIDINE 20 MG/1
20 TABLET, FILM COATED ORAL 2 TIMES DAILY
Qty: 60 TABLET | Refills: 1 | Status: SHIPPED | OUTPATIENT
Start: 2022-04-11

## 2022-04-14 LAB
A ALTERNATA IGE QN: <0.1 KUA/L (ref ?–0.1)
C HERBARUM IGE QN: <0.1 KUA/L (ref ?–0.1)
CAT DANDER IGE QN: <0.1 KUA/L (ref ?–0.1)
COMMON RAGWEED IGE QN: <0.1 KUA/L (ref ?–0.1)
D FARINAE IGE QN: <0.1 KUA/L (ref ?–0.1)
DOG DANDER IGE QN: <0.1 KUA/L (ref ?–0.1)
GOOSEFOOT IGE QN: <0.1 KUA/L (ref ?–0.1)
HOUSE DUST HS IGE QN: <0.1 KUA/L (ref ?–0.1)
IGE SERPL-ACNC: 19.6 KU/L (ref 2–214)
KENT BLUE GRASS IGE QN: <0.1 KUA/L (ref ?–0.1)
PER RYE GRASS IGE QN: <0.1 KUA/L (ref ?–0.1)
WHITE ELM IGE QN: <0.1 KUA/L (ref ?–0.1)
WHITE OAK IGE QN: <0.1 KUA/L (ref ?–0.1)

## 2022-05-16 ENCOUNTER — OFFICE VISIT (OUTPATIENT)
Dept: ALLERGY | Facility: CLINIC | Age: 47
End: 2022-05-16
Payer: COMMERCIAL

## 2022-05-16 ENCOUNTER — NURSE ONLY (OUTPATIENT)
Dept: ALLERGY | Facility: CLINIC | Age: 47
End: 2022-05-16
Payer: COMMERCIAL

## 2022-05-16 VITALS
HEIGHT: 66 IN | HEART RATE: 69 BPM | DIASTOLIC BLOOD PRESSURE: 78 MMHG | BODY MASS INDEX: 32.14 KG/M2 | TEMPERATURE: 99 F | SYSTOLIC BLOOD PRESSURE: 123 MMHG | WEIGHT: 200 LBS | OXYGEN SATURATION: 96 %

## 2022-05-16 DIAGNOSIS — Z92.29 COVID-19 VACCINE SERIES COMPLETED: ICD-10-CM

## 2022-05-16 DIAGNOSIS — J30.89 ENVIRONMENTAL AND SEASONAL ALLERGIES: ICD-10-CM

## 2022-05-16 DIAGNOSIS — L50.8 ACUTE URTICARIA: Primary | ICD-10-CM

## 2022-05-16 PROCEDURE — 3008F BODY MASS INDEX DOCD: CPT | Performed by: ALLERGY & IMMUNOLOGY

## 2022-05-16 PROCEDURE — 3078F DIAST BP <80 MM HG: CPT | Performed by: ALLERGY & IMMUNOLOGY

## 2022-05-16 PROCEDURE — 95004 PERQ TESTS W/ALRGNC XTRCS: CPT | Performed by: ALLERGY & IMMUNOLOGY

## 2022-05-16 PROCEDURE — 99244 OFF/OP CNSLTJ NEW/EST MOD 40: CPT | Performed by: ALLERGY & IMMUNOLOGY

## 2022-05-16 PROCEDURE — 3074F SYST BP LT 130 MM HG: CPT | Performed by: ALLERGY & IMMUNOLOGY

## 2022-05-16 RX ORDER — LEVOCETIRIZINE DIHYDROCHLORIDE 5 MG/1
5 TABLET, FILM COATED ORAL NIGHTLY
Qty: 30 TABLET | Refills: 0 | Status: SHIPPED | OUTPATIENT
Start: 2022-05-16

## 2022-05-16 NOTE — PATIENT INSTRUCTIONS
#1 acute urticaria   6-week history of an acute urticarial-like rash. Potential dermatographia component at 1 time. Patient has been rash free over the past 2 weeks now. No current rash in office.   Dermatographia screen in office appears negative  Patient denies appreciating bite sting infection new medications soaps detergents topicals  Reviewed prior serum IgE testing to environmental allergens as documented in my note above  See above skin testing to environmental allergens to reevaluate  Should hives return recommend to restart Xyzal, levocetirizine 5 g once a day up to 4 times per day as needed  Patient to contact my office should hives return or worsening in spite of Xyzal.  Reviewed most hives resolved in 6 weeks or less    #2 COVID vaccination x2 doses  Recommend booster

## 2022-05-21 ENCOUNTER — HOSPITAL ENCOUNTER (OUTPATIENT)
Dept: ULTRASOUND IMAGING | Age: 47
Discharge: HOME | End: 2022-05-21
Attending: FAMILY MEDICINE
Payer: COMMERCIAL

## 2022-05-21 DIAGNOSIS — R79.89 ELEVATED LIVER FUNCTION TESTS: ICD-10-CM

## 2022-05-21 PROCEDURE — 76705 ECHO EXAM OF ABDOMEN: CPT | Performed by: FAMILY MEDICINE

## 2022-06-02 ENCOUNTER — OFFICE VISIT (OUTPATIENT)
Dept: FAMILY MEDICINE CLINIC | Facility: CLINIC | Age: 47
End: 2022-06-02
Payer: COMMERCIAL

## 2022-06-02 VITALS
HEIGHT: 66 IN | HEART RATE: 69 BPM | WEIGHT: 197.81 LBS | SYSTOLIC BLOOD PRESSURE: 117 MMHG | TEMPERATURE: 97 F | BODY MASS INDEX: 31.79 KG/M2 | DIASTOLIC BLOOD PRESSURE: 79 MMHG

## 2022-06-02 DIAGNOSIS — E66.09 NON MORBID OBESITY DUE TO EXCESS CALORIES: ICD-10-CM

## 2022-06-02 DIAGNOSIS — R79.89 ELEVATED LIVER FUNCTION TESTS: Primary | ICD-10-CM

## 2022-06-02 DIAGNOSIS — K76.0 FATTY LIVER: ICD-10-CM

## 2022-06-02 DIAGNOSIS — E78.2 MIXED HYPERLIPIDEMIA: ICD-10-CM

## 2022-06-02 PROCEDURE — 99214 OFFICE O/P EST MOD 30 MIN: CPT | Performed by: FAMILY MEDICINE

## 2022-06-02 PROCEDURE — 3074F SYST BP LT 130 MM HG: CPT | Performed by: FAMILY MEDICINE

## 2022-06-02 PROCEDURE — 3008F BODY MASS INDEX DOCD: CPT | Performed by: FAMILY MEDICINE

## 2022-06-02 PROCEDURE — 3078F DIAST BP <80 MM HG: CPT | Performed by: FAMILY MEDICINE

## 2022-06-02 RX ORDER — ICOSAPENT ETHYL 1000 MG/1
2 CAPSULE ORAL 2 TIMES DAILY
Qty: 360 CAPSULE | Refills: 1 | Status: SHIPPED | OUTPATIENT
Start: 2022-06-02 | End: 2022-06-06

## 2022-06-02 RX ORDER — ERGOCALCIFEROL 1.25 MG/1
50000 CAPSULE ORAL WEEKLY
COMMUNITY
Start: 2022-05-11

## 2022-06-03 ENCOUNTER — TELEPHONE (OUTPATIENT)
Dept: FAMILY MEDICINE CLINIC | Facility: CLINIC | Age: 47
End: 2022-06-03

## 2022-06-03 DIAGNOSIS — E78.2 MIXED HYPERLIPIDEMIA: Primary | ICD-10-CM

## 2022-06-03 NOTE — TELEPHONE ENCOUNTER
Prior authorization form has been filled out for vascepa and faxed to [employer] to [phone] and [phone].  It can take 1-5 business days for a decision to come back

## 2022-06-06 RX ORDER — NIACIN 500 MG/1
500 TABLET, EXTENDED RELEASE ORAL NIGHTLY
Qty: 90 TABLET | Refills: 0 | Status: SHIPPED | OUTPATIENT
Start: 2022-06-06

## 2022-06-06 NOTE — TELEPHONE ENCOUNTER
Inform him his insurance refuses to cover Vascepa. We will need to try a drug called niacin instead and I will send this to the pharmacy. He should take this at bedtime. This drug helps to increase the good cholesterol and lower the triglycerides.

## 2022-07-19 NOTE — TELEPHONE ENCOUNTER
Date: 07/19/22   Name: Zoya Quintanilla    Pre-Procedural Diagnosis:    Diagnosis Orders   1. Low back pain, unspecified back pain laterality, unspecified chronicity, unspecified whether sciatica present  methylPREDNISolone acetate (DEPO-MEDROL) injection 220 mg    INJECT TRIGGER POINT, 1 OR 2      2. Lumbosacral radiculopathy  XR INJ SPINE THER SUBST LUM/SAC W IMG    methylPREDNISolone acetate (DEPO-MEDROL) injection 220 mg          Procedure: Lumbar Epidural Steroid Injection (LAURA) with Trigger Point Injection(s)    Precautions: LB Precautions spine injections: None. Patient denies any prior sensitivity to steroid, local anesthetic, contrast dye, iodine or shellfish. The procedure was discussed at length with the patient and informed consent was signed. The patient was placed in a prone position on the fluoroscopy table and the skin was prepped and draped in a routine sterile fashion. The areas to be injected were each anesthetized with approximately 5 cc of 1% Lidocaine. Initially a 22-gauge 3.5 inch inch spinal needle was carefully advanced under fluoroscopic guidance to the right L3-L4 epidural space. At this time 0.25 cc of omnipaque administered. . Once proper placement was confirmed, 3 cc of sterile water and 100 mg of DepoMedrol were injected through the spinal needle. After informed oral consent, patient was prepped per routine. The bilateral lumbar paraspinal area(s) were injected. 60 mg of DepoMedrol with 1 cc of 0.25% Marcaine injected at each site. Patient tolerated well. Band aid(s) applied. Fluoroscopic guidance was used intermittently over a 10-minute period to insure proper needle placement and patient safety. A hard copy of the fluoroscopic  images has been placed in the patient's chart. The patient was monitored after the procedure and discharged home without complication.      Resume Meds:  Patient to resume Plavix in the AM. Remains on asa 81 mg.    SHADIA Stearns, Letter sent

## 2022-09-02 ENCOUNTER — LAB ENCOUNTER (OUTPATIENT)
Dept: LAB | Age: 47
End: 2022-09-02
Attending: FAMILY MEDICINE
Payer: COMMERCIAL

## 2022-09-02 DIAGNOSIS — R79.89 ELEVATED LIVER FUNCTION TESTS: ICD-10-CM

## 2022-09-02 DIAGNOSIS — K76.0 FATTY LIVER: ICD-10-CM

## 2022-09-02 DIAGNOSIS — E78.2 MIXED HYPERLIPIDEMIA: ICD-10-CM

## 2022-09-02 LAB
ALT SERPL-CCNC: 63 U/L
AST SERPL-CCNC: 32 U/L (ref 15–37)
CHOLEST SERPL-MCNC: 180 MG/DL (ref ?–200)
FASTING PATIENT LIPID ANSWER: YES
HDLC SERPL-MCNC: 33 MG/DL (ref 40–59)
LDLC SERPL CALC-MCNC: 82 MG/DL (ref ?–100)
NONHDLC SERPL-MCNC: 147 MG/DL (ref ?–130)
TRIGL SERPL-MCNC: 399 MG/DL (ref 30–149)
VLDLC SERPL CALC-MCNC: 64 MG/DL (ref 0–30)

## 2022-09-02 PROCEDURE — 84460 ALANINE AMINO (ALT) (SGPT): CPT

## 2022-09-02 PROCEDURE — 84450 TRANSFERASE (AST) (SGOT): CPT

## 2022-09-02 PROCEDURE — 80061 LIPID PANEL: CPT

## 2022-09-02 PROCEDURE — 36415 COLL VENOUS BLD VENIPUNCTURE: CPT

## 2022-09-16 ENCOUNTER — OFFICE VISIT (OUTPATIENT)
Dept: FAMILY MEDICINE CLINIC | Facility: CLINIC | Age: 47
End: 2022-09-16
Payer: COMMERCIAL

## 2022-09-16 VITALS
HEIGHT: 66 IN | DIASTOLIC BLOOD PRESSURE: 68 MMHG | BODY MASS INDEX: 31.31 KG/M2 | WEIGHT: 194.81 LBS | SYSTOLIC BLOOD PRESSURE: 102 MMHG | TEMPERATURE: 98 F | HEART RATE: 67 BPM

## 2022-09-16 DIAGNOSIS — Z12.11 SCREENING FOR COLON CANCER: ICD-10-CM

## 2022-09-16 DIAGNOSIS — R30.0 DYSURIA: Primary | ICD-10-CM

## 2022-09-16 DIAGNOSIS — E78.2 MIXED HYPERLIPIDEMIA: ICD-10-CM

## 2022-09-16 LAB
APPEARANCE: CLEAR
BILIRUB UR QL: NEGATIVE
BILIRUBIN: NEGATIVE
CLARITY UR: CLEAR
COLOR UR: YELLOW
GLUCOSE (URINE DIPSTICK): NEGATIVE MG/DL
GLUCOSE UR-MCNC: NEGATIVE MG/DL
KETONES (URINE DIPSTICK): NEGATIVE MG/DL
KETONES UR-MCNC: NEGATIVE MG/DL
LEUKOCYTE ESTERASE UR QL STRIP.AUTO: NEGATIVE
LEUKOCYTES: NEGATIVE
MULTISTIX LOT#: ABNORMAL NUMERIC
NITRITE UR QL STRIP.AUTO: NEGATIVE
NITRITE, URINE: NEGATIVE
PH UR: 5.5 [PH] (ref 5–8)
PH, URINE: 6 (ref 4.5–8)
PROT UR-MCNC: NEGATIVE MG/DL
PROTEIN (URINE DIPSTICK): NEGATIVE MG/DL
SP GR UR STRIP: 1.02 (ref 1–1.03)
SPECIFIC GRAVITY: >=1.03 (ref 1–1.03)
URINE-COLOR: YELLOW
UROBILINOGEN UR STRIP-ACNC: 0.2
UROBILINOGEN,SEMI-QN: 0.2 MG/DL (ref 0–1.9)

## 2022-09-16 PROCEDURE — 99214 OFFICE O/P EST MOD 30 MIN: CPT | Performed by: FAMILY MEDICINE

## 2022-09-16 PROCEDURE — 3078F DIAST BP <80 MM HG: CPT | Performed by: FAMILY MEDICINE

## 2022-09-16 PROCEDURE — 3008F BODY MASS INDEX DOCD: CPT | Performed by: FAMILY MEDICINE

## 2022-09-16 PROCEDURE — 3074F SYST BP LT 130 MM HG: CPT | Performed by: FAMILY MEDICINE

## 2022-09-16 PROCEDURE — 81003 URINALYSIS AUTO W/O SCOPE: CPT | Performed by: FAMILY MEDICINE

## 2022-09-16 RX ORDER — COVID-19 ANTIGEN TEST
KIT MISCELLANEOUS
COMMUNITY
Start: 2022-05-27

## 2022-09-16 RX ORDER — CIPROFLOXACIN 500 MG/1
500 TABLET, FILM COATED ORAL 2 TIMES DAILY
Qty: 10 TABLET | Refills: 0 | Status: SHIPPED | OUTPATIENT
Start: 2022-09-16 | End: 2022-09-21

## 2022-09-16 RX ORDER — NIACIN 500 MG/1
1000 TABLET, EXTENDED RELEASE ORAL NIGHTLY
Qty: 180 TABLET | Refills: 1 | Status: SHIPPED | OUTPATIENT
Start: 2022-09-16

## 2022-09-16 NOTE — PATIENT INSTRUCTIONS
Start taking 2 of the Niaspan nightly and then I want you to recheck labs in about 2 months so around November 15, 2022.

## 2022-11-15 ENCOUNTER — LAB ENCOUNTER (OUTPATIENT)
Dept: LAB | Age: 47
End: 2022-11-15
Attending: FAMILY MEDICINE
Payer: COMMERCIAL

## 2022-11-15 DIAGNOSIS — E78.2 MIXED HYPERLIPIDEMIA: ICD-10-CM

## 2022-11-15 LAB
ALT SERPL-CCNC: 65 U/L
AST SERPL-CCNC: 34 U/L (ref 15–37)
CHOLEST SERPL-MCNC: 188 MG/DL (ref ?–200)
FASTING PATIENT LIPID ANSWER: YES
HDLC SERPL-MCNC: 34 MG/DL (ref 40–59)
LDLC SERPL CALC-MCNC: 86 MG/DL (ref ?–100)
NONHDLC SERPL-MCNC: 154 MG/DL (ref ?–130)
TRIGL SERPL-MCNC: 414 MG/DL (ref 30–149)
VLDLC SERPL CALC-MCNC: 68 MG/DL (ref 0–30)

## 2022-11-15 PROCEDURE — 84460 ALANINE AMINO (ALT) (SGPT): CPT

## 2022-11-15 PROCEDURE — 80061 LIPID PANEL: CPT

## 2022-11-15 PROCEDURE — 84450 TRANSFERASE (AST) (SGOT): CPT

## 2022-11-15 PROCEDURE — 36415 COLL VENOUS BLD VENIPUNCTURE: CPT

## 2022-12-12 ENCOUNTER — OFFICE VISIT (OUTPATIENT)
Dept: FAMILY MEDICINE CLINIC | Facility: CLINIC | Age: 47
End: 2022-12-12
Payer: COMMERCIAL

## 2022-12-12 VITALS
WEIGHT: 197 LBS | SYSTOLIC BLOOD PRESSURE: 109 MMHG | BODY MASS INDEX: 31.66 KG/M2 | DIASTOLIC BLOOD PRESSURE: 73 MMHG | HEIGHT: 66 IN | HEART RATE: 72 BPM | TEMPERATURE: 97 F

## 2022-12-12 DIAGNOSIS — E78.1 HYPERGLYCERIDEMIA: Primary | ICD-10-CM

## 2022-12-12 DIAGNOSIS — K76.0 FATTY LIVER: ICD-10-CM

## 2022-12-12 DIAGNOSIS — Z23 NEED FOR VACCINATION: ICD-10-CM

## 2022-12-12 DIAGNOSIS — R10.13 EPIGASTRIC ABDOMINAL PAIN: ICD-10-CM

## 2022-12-12 PROCEDURE — 3078F DIAST BP <80 MM HG: CPT | Performed by: FAMILY MEDICINE

## 2022-12-12 PROCEDURE — 3074F SYST BP LT 130 MM HG: CPT | Performed by: FAMILY MEDICINE

## 2022-12-12 PROCEDURE — 90471 IMMUNIZATION ADMIN: CPT | Performed by: FAMILY MEDICINE

## 2022-12-12 PROCEDURE — 99214 OFFICE O/P EST MOD 30 MIN: CPT | Performed by: FAMILY MEDICINE

## 2022-12-12 PROCEDURE — 90686 IIV4 VACC NO PRSV 0.5 ML IM: CPT | Performed by: FAMILY MEDICINE

## 2022-12-12 PROCEDURE — 3008F BODY MASS INDEX DOCD: CPT | Performed by: FAMILY MEDICINE

## 2022-12-12 RX ORDER — GEMFIBROZIL 600 MG/1
600 TABLET, FILM COATED ORAL
Qty: 180 TABLET | Refills: 1 | Status: SHIPPED | OUTPATIENT
Start: 2022-12-12

## 2022-12-12 RX ORDER — OMEPRAZOLE 40 MG/1
40 CAPSULE, DELAYED RELEASE ORAL DAILY
Qty: 90 CAPSULE | Refills: 1 | Status: SHIPPED | OUTPATIENT
Start: 2022-12-12 | End: 2023-12-07

## 2023-02-11 ENCOUNTER — LAB ENCOUNTER (OUTPATIENT)
Dept: LAB | Age: 48
End: 2023-02-11
Attending: FAMILY MEDICINE
Payer: COMMERCIAL

## 2023-02-11 DIAGNOSIS — E78.1 HYPERGLYCERIDEMIA: ICD-10-CM

## 2023-02-11 DIAGNOSIS — K76.0 FATTY LIVER: ICD-10-CM

## 2023-02-11 LAB
ALT SERPL-CCNC: 42 U/L
AST SERPL-CCNC: 25 U/L (ref 15–37)
CHOLEST SERPL-MCNC: 194 MG/DL (ref ?–200)
FASTING PATIENT LIPID ANSWER: YES
HDLC SERPL-MCNC: 31 MG/DL (ref 40–59)
LDLC SERPL CALC-MCNC: 113 MG/DL (ref ?–100)
NONHDLC SERPL-MCNC: 163 MG/DL (ref ?–130)
TRIGL SERPL-MCNC: 286 MG/DL (ref 30–149)
VLDLC SERPL CALC-MCNC: 50 MG/DL (ref 0–30)

## 2023-02-11 PROCEDURE — 84460 ALANINE AMINO (ALT) (SGPT): CPT

## 2023-02-11 PROCEDURE — 84450 TRANSFERASE (AST) (SGOT): CPT

## 2023-02-11 PROCEDURE — 80061 LIPID PANEL: CPT

## 2023-02-11 PROCEDURE — 36415 COLL VENOUS BLD VENIPUNCTURE: CPT

## 2023-02-23 ENCOUNTER — TELEPHONE (OUTPATIENT)
Dept: FAMILY MEDICINE CLINIC | Facility: CLINIC | Age: 48
End: 2023-02-23

## 2023-02-23 RX ORDER — NABUMETONE 750 MG/1
750 TABLET, FILM COATED ORAL 2 TIMES DAILY
Qty: 60 TABLET | Refills: 1 | Status: SHIPPED | OUTPATIENT
Start: 2023-02-23

## 2023-02-23 NOTE — TELEPHONE ENCOUNTER
Wife was in for her own appointment but stated that her  needed they nabumetone for his low back pain that he gets periodically. He will also see me in 6 months which is what time I want to see her and they will both ask for labs to be ordered prior to their visit.

## 2023-04-24 RX ORDER — NABUMETONE 750 MG/1
750 TABLET, FILM COATED ORAL 2 TIMES DAILY
Qty: 180 TABLET | Refills: 1 | Status: SHIPPED | OUTPATIENT
Start: 2023-04-24

## 2023-04-24 NOTE — TELEPHONE ENCOUNTER
Refill passed per [facility] protocol. Requested Prescriptions   Pending Prescriptions Disp Refills    NABUMETONE 750 MG Oral Tab [Pharmacy Med Name: Nabumetone 750 Mg Tab Scie] 60 tablet 0     Sig: Take 1 tablet (750 mg total) by mouth 2 (two) times daily. Take with meals. (for pain/inflammation).        Non-Narcotic Pain Medication Protocol Passed - 4/24/2023  2:31 AM        Passed - In person appointment or virtual visit in the past 6 mos or appointment in next 3 mos     Recent Outpatient Visits              4 months ago [address] [address], [employer], DO    Office Visit    7 months ago [address] [facility], [address], [employer], DO    Office Visit    10 months ago Elevated liver function tests    [facility], [address], [employer], DO    Office Visit    11 months ago [facility] [address] [place] [address] [place]    Nurse Only    11 months ago [facility] urtic[facility]    Office Visit                            Recent Outpatient Visits              4 months ago [facility], [address], [employer], DO    Office Visit    7 months ago [address] [facility], [facility], [address], [employer], DO    Office Visit    10 months ago Elevated liver function tests    [facility], [address], [employer], DO    Office Visit    11 months ago [facility] and seasonal [facility] Ana Ambriz    Nurse Only    11 months ago Acute urticaria    [facility]    Office Visit

## 2023-07-13 ENCOUNTER — TELEPHONE (OUTPATIENT)
Dept: FAMILY MEDICINE CLINIC | Facility: CLINIC | Age: 48
End: 2023-07-13

## 2023-07-13 DIAGNOSIS — Z00.00 ADULT GENERAL MEDICAL EXAM: Primary | ICD-10-CM

## 2023-07-13 NOTE — TELEPHONE ENCOUNTER
I went ahead and ordered all of his labs as he is due for a physical exam.  He needs to be worked in before July 31, 2023 for a physical exam.

## 2023-07-13 NOTE — TELEPHONE ENCOUNTER
Spoke, with the patient and informed him of the message below. Pt has an appointment scheduled with Dr. Lila Hook on 7-17-23.

## 2023-07-14 ENCOUNTER — LAB ENCOUNTER (OUTPATIENT)
Dept: LAB | Age: 48
End: 2023-07-14
Attending: FAMILY MEDICINE
Payer: COMMERCIAL

## 2023-07-14 DIAGNOSIS — Z00.00 ADULT GENERAL MEDICAL EXAM: ICD-10-CM

## 2023-07-14 LAB
ALBUMIN SERPL-MCNC: 3.7 G/DL (ref 3.4–5)
ALBUMIN/GLOB SERPL: 1 {RATIO} (ref 1–2)
ALP LIVER SERPL-CCNC: 72 U/L
ALT SERPL-CCNC: 49 U/L
ANION GAP SERPL CALC-SCNC: 6 MMOL/L (ref 0–18)
AST SERPL-CCNC: 39 U/L (ref 15–37)
BASOPHILS # BLD AUTO: 0.05 X10(3) UL (ref 0–0.2)
BASOPHILS NFR BLD AUTO: 0.7 %
BILIRUB SERPL-MCNC: 0.7 MG/DL (ref 0.1–2)
BUN BLD-MCNC: 18 MG/DL (ref 7–18)
CALCIUM BLD-MCNC: 9.9 MG/DL (ref 8.5–10.1)
CHLORIDE SERPL-SCNC: 105 MMOL/L (ref 98–112)
CHOLEST SERPL-MCNC: 201 MG/DL (ref ?–200)
CO2 SERPL-SCNC: 26 MMOL/L (ref 21–32)
COMPLEXED PSA SERPL-MCNC: 0.62 NG/ML (ref ?–4)
CREAT BLD-MCNC: 0.83 MG/DL
EOSINOPHIL # BLD AUTO: 0.18 X10(3) UL (ref 0–0.7)
EOSINOPHIL NFR BLD AUTO: 2.6 %
ERYTHROCYTE [DISTWIDTH] IN BLOOD BY AUTOMATED COUNT: 12.6 %
FASTING PATIENT LIPID ANSWER: YES
FASTING STATUS PATIENT QL REPORTED: YES
GFR SERPLBLD BASED ON 1.73 SQ M-ARVRAT: 107 ML/MIN/1.73M2 (ref 60–?)
GLOBULIN PLAS-MCNC: 3.6 G/DL (ref 2.8–4.4)
GLUCOSE BLD-MCNC: 114 MG/DL (ref 70–99)
HCT VFR BLD AUTO: 47 %
HDLC SERPL-MCNC: 34 MG/DL (ref 40–59)
HGB BLD-MCNC: 15.4 G/DL
IMM GRANULOCYTES # BLD AUTO: 0.01 X10(3) UL (ref 0–1)
IMM GRANULOCYTES NFR BLD: 0.1 %
LDLC SERPL CALC-MCNC: 75 MG/DL (ref ?–100)
LYMPHOCYTES # BLD AUTO: 3.29 X10(3) UL (ref 1–4)
LYMPHOCYTES NFR BLD AUTO: 47.8 %
MCH RBC QN AUTO: 30.2 PG (ref 26–34)
MCHC RBC AUTO-ENTMCNC: 32.8 G/DL (ref 31–37)
MCV RBC AUTO: 92.2 FL
MONOCYTES # BLD AUTO: 0.62 X10(3) UL (ref 0.1–1)
MONOCYTES NFR BLD AUTO: 9 %
NEUTROPHILS # BLD AUTO: 2.74 X10 (3) UL (ref 1.5–7.7)
NEUTROPHILS # BLD AUTO: 2.74 X10(3) UL (ref 1.5–7.7)
NEUTROPHILS NFR BLD AUTO: 39.8 %
NONHDLC SERPL-MCNC: 167 MG/DL (ref ?–130)
OSMOLALITY SERPL CALC.SUM OF ELEC: 287 MOSM/KG (ref 275–295)
PLATELET # BLD AUTO: 232 10(3)UL (ref 150–450)
POTASSIUM SERPL-SCNC: 4.1 MMOL/L (ref 3.5–5.1)
PROT SERPL-MCNC: 7.3 G/DL (ref 6.4–8.2)
RBC # BLD AUTO: 5.1 X10(6)UL
SODIUM SERPL-SCNC: 137 MMOL/L (ref 136–145)
TRIGL SERPL-MCNC: 585 MG/DL (ref 30–149)
TSI SER-ACNC: 0.93 MIU/ML (ref 0.36–3.74)
VLDLC SERPL CALC-MCNC: 93 MG/DL (ref 0–30)
WBC # BLD AUTO: 6.9 X10(3) UL (ref 4–11)

## 2023-07-14 PROCEDURE — 84443 ASSAY THYROID STIM HORMONE: CPT

## 2023-07-14 PROCEDURE — 36415 COLL VENOUS BLD VENIPUNCTURE: CPT

## 2023-07-14 PROCEDURE — 80053 COMPREHEN METABOLIC PANEL: CPT

## 2023-07-14 PROCEDURE — 83036 HEMOGLOBIN GLYCOSYLATED A1C: CPT | Performed by: FAMILY MEDICINE

## 2023-07-14 PROCEDURE — 85025 COMPLETE CBC W/AUTO DIFF WBC: CPT

## 2023-07-14 PROCEDURE — 80061 LIPID PANEL: CPT

## 2023-07-17 ENCOUNTER — OFFICE VISIT (OUTPATIENT)
Dept: FAMILY MEDICINE CLINIC | Facility: CLINIC | Age: 48
End: 2023-07-17

## 2023-07-17 ENCOUNTER — EKG ENCOUNTER (OUTPATIENT)
Dept: LAB | Age: 48
End: 2023-07-17
Attending: FAMILY MEDICINE
Payer: COMMERCIAL

## 2023-07-17 VITALS
TEMPERATURE: 98 F | HEART RATE: 74 BPM | SYSTOLIC BLOOD PRESSURE: 115 MMHG | HEIGHT: 66 IN | DIASTOLIC BLOOD PRESSURE: 71 MMHG | WEIGHT: 192.63 LBS | BODY MASS INDEX: 30.96 KG/M2

## 2023-07-17 DIAGNOSIS — E55.9 VITAMIN D DEFICIENCY: ICD-10-CM

## 2023-07-17 DIAGNOSIS — Z00.00 ADULT GENERAL MEDICAL EXAM: Primary | ICD-10-CM

## 2023-07-17 DIAGNOSIS — E66.09 NON MORBID OBESITY DUE TO EXCESS CALORIES: ICD-10-CM

## 2023-07-17 DIAGNOSIS — E78.1 HYPERGLYCERIDEMIA: ICD-10-CM

## 2023-07-17 DIAGNOSIS — K76.0 FATTY LIVER: ICD-10-CM

## 2023-07-17 DIAGNOSIS — Z12.11 SCREENING FOR COLON CANCER: ICD-10-CM

## 2023-07-17 DIAGNOSIS — R10.13 EPIGASTRIC ABDOMINAL PAIN: ICD-10-CM

## 2023-07-17 PROCEDURE — 3078F DIAST BP <80 MM HG: CPT | Performed by: FAMILY MEDICINE

## 2023-07-17 PROCEDURE — 93005 ELECTROCARDIOGRAM TRACING: CPT

## 2023-07-17 PROCEDURE — 93010 ELECTROCARDIOGRAM REPORT: CPT | Performed by: INTERNAL MEDICINE

## 2023-07-17 PROCEDURE — 99396 PREV VISIT EST AGE 40-64: CPT | Performed by: FAMILY MEDICINE

## 2023-07-17 PROCEDURE — 3008F BODY MASS INDEX DOCD: CPT | Performed by: FAMILY MEDICINE

## 2023-07-17 PROCEDURE — 3074F SYST BP LT 130 MM HG: CPT | Performed by: FAMILY MEDICINE

## 2023-07-17 RX ORDER — PHENTERMINE HYDROCHLORIDE 15 MG/1
15 CAPSULE ORAL EVERY MORNING
Qty: 90 CAPSULE | Refills: 0 | Status: SHIPPED | OUTPATIENT
Start: 2023-07-17 | End: 2023-07-17

## 2023-07-17 RX ORDER — ERGOCALCIFEROL 1.25 MG/1
50000 CAPSULE ORAL WEEKLY
Qty: 12 CAPSULE | Refills: 1 | Status: SHIPPED | OUTPATIENT
Start: 2023-07-17

## 2023-07-17 RX ORDER — PHENTERMINE HYDROCHLORIDE 15 MG/1
15 CAPSULE ORAL EVERY MORNING
Qty: 90 CAPSULE | Refills: 0 | Status: SHIPPED | OUTPATIENT
Start: 2023-07-17

## 2023-07-17 RX ORDER — OMEPRAZOLE 40 MG/1
40 CAPSULE, DELAYED RELEASE ORAL DAILY
Qty: 90 CAPSULE | Refills: 3 | Status: SHIPPED | OUTPATIENT
Start: 2023-07-17 | End: 2024-07-11

## 2023-07-18 ENCOUNTER — TELEPHONE (OUTPATIENT)
Dept: FAMILY MEDICINE CLINIC | Facility: CLINIC | Age: 48
End: 2023-07-18

## 2023-07-18 DIAGNOSIS — I51.7 LVH (LEFT VENTRICULAR HYPERTROPHY): ICD-10-CM

## 2023-07-18 DIAGNOSIS — R94.31 ABNORMAL EKG: Primary | ICD-10-CM

## 2023-07-18 LAB
ATRIAL RATE: 68 BPM
P AXIS: -9 DEGREES
P-R INTERVAL: 138 MS
Q-T INTERVAL: 432 MS
QRS DURATION: 114 MS
QTC CALCULATION (BEZET): 459 MS
R AXIS: 12 DEGREES
T AXIS: 19 DEGREES
VENTRICULAR RATE: 68 BPM

## 2023-07-18 NOTE — TELEPHONE ENCOUNTER
Approved    Prior authorization approved   Case ID: [id number]      Payer: [address] [place] [address] Road    [phone]   Your request was approved based on the initial information provided at the time of the coverage request submission. Please allow additional time for the final decision to be made and added to the patient's account.    Electronic appeal: Not supported   View History

## 2023-07-25 ENCOUNTER — HOSPITAL ENCOUNTER (OUTPATIENT)
Dept: CV DIAGNOSTICS | Age: 48
Discharge: HOME | End: 2023-07-25
Attending: FAMILY MEDICINE
Payer: COMMERCIAL

## 2023-07-25 DIAGNOSIS — R94.31 ABNORMAL EKG: ICD-10-CM

## 2023-07-25 DIAGNOSIS — I51.7 LVH (LEFT VENTRICULAR HYPERTROPHY): ICD-10-CM

## 2023-07-25 PROCEDURE — 93306 TTE W/DOPPLER COMPLETE: CPT | Performed by: FAMILY MEDICINE

## 2023-07-26 ENCOUNTER — TELEPHONE (OUTPATIENT)
Dept: FAMILY MEDICINE CLINIC | Facility: CLINIC | Age: 48
End: 2023-07-26

## 2023-10-03 ENCOUNTER — TELEPHONE (OUTPATIENT)
Dept: FAMILY MEDICINE CLINIC | Facility: CLINIC | Age: 48
End: 2023-10-03

## 2023-10-03 DIAGNOSIS — Z23 NEED FOR VACCINATION: Primary | ICD-10-CM

## 2023-10-03 NOTE — TELEPHONE ENCOUNTER
Pt is due for colonoscopy and singles I have mailed out a copy of the referral and information for shingles

## 2024-01-10 DIAGNOSIS — E55.9 VITAMIN D DEFICIENCY: ICD-10-CM

## 2024-01-10 NOTE — TELEPHONE ENCOUNTER
Please review; protocol failed.    Last vitamin D 01/29/22    Ref Range & Units 1/29/22  9:56 AM   Vitamin D, 25OH, Total  30.0 - 100.0 ng/mL 9.4 Low        Requested Prescriptions   Pending Prescriptions Disp Refills    ERGOCALCIFEROL 1.25 MG (75315 UT) Oral Cap [Pharmacy Med Name: Vitamin D 50,000 Unit Cap Bion] 12 capsule 0     Sig: Take 1 capsule (50,000 Units total) by mouth once a week.       There is no refill protocol information for this order

## 2024-01-12 RX ORDER — ERGOCALCIFEROL 1.25 MG/1
50000 CAPSULE ORAL WEEKLY
Qty: 12 CAPSULE | Refills: 0 | Status: SHIPPED | OUTPATIENT
Start: 2024-01-12

## 2024-01-12 NOTE — TELEPHONE ENCOUNTER
Refilled times 1 but needs appointment before the next prescription will be filled.  Please call patient and set up an office visit as overdue for weight evaluation..

## 2024-01-16 NOTE — TELEPHONE ENCOUNTER
Spoke, with the patient and informed him of the message below. Pt did schedule an appointment to see Dr. Jameson on 2-5-24.

## 2024-02-19 ENCOUNTER — OFFICE VISIT (OUTPATIENT)
Dept: FAMILY MEDICINE CLINIC | Facility: CLINIC | Age: 49
End: 2024-02-19

## 2024-02-19 VITALS
HEIGHT: 66 IN | HEART RATE: 70 BPM | SYSTOLIC BLOOD PRESSURE: 122 MMHG | TEMPERATURE: 97 F | WEIGHT: 192 LBS | DIASTOLIC BLOOD PRESSURE: 80 MMHG | BODY MASS INDEX: 30.86 KG/M2

## 2024-02-19 DIAGNOSIS — R73.9 HYPERGLYCEMIA: ICD-10-CM

## 2024-02-19 DIAGNOSIS — K76.0 FATTY LIVER: ICD-10-CM

## 2024-02-19 DIAGNOSIS — E66.09 NON MORBID OBESITY DUE TO EXCESS CALORIES: ICD-10-CM

## 2024-02-19 DIAGNOSIS — E78.1 HYPERGLYCERIDEMIA: Primary | ICD-10-CM

## 2024-02-19 DIAGNOSIS — Z23 NEED FOR VACCINATION: ICD-10-CM

## 2024-02-19 DIAGNOSIS — R10.13 EPIGASTRIC ABDOMINAL PAIN: ICD-10-CM

## 2024-02-19 DIAGNOSIS — Z12.11 SCREENING FOR COLON CANCER: ICD-10-CM

## 2024-02-19 RX ORDER — OMEPRAZOLE 40 MG/1
40 CAPSULE, DELAYED RELEASE ORAL DAILY
Qty: 90 CAPSULE | Refills: 3 | Status: SHIPPED | OUTPATIENT
Start: 2024-02-19 | End: 2025-02-13

## 2024-02-19 NOTE — PROGRESS NOTES
Patient ID: Juan Alberto Vera is a 48 year old male.    Medication Follow-Up  Pertinent negatives include no chest pain.     Chief Complaint   Patient presents with    Medication Follow-Up     Last seen by me on 07/17/2023.    Patient visits here today with his wife. Pt works as a supervisor.     He is taking weekly Vitamin D. He is taking gemfibrozil 600 mg for cholesterol and omeprazole 40 mg for acid reflux. Pt is due for a colonoscopy; I provided a referral to GI as he has not gone with the other referrals and I again explained to him why this is so important and we do not want him to have a colon cancer that becomes aggressive and spreads. wife also understands the importance is for him to get.  Patient will receive the Shingrix vaccine today in the office.    Last A1c value was 6.4% done 7/14/2023. I reviewed his labs from July 2023. His triglycerides were 585 and his sugar was elevated. I reviewed his echocardiogram from July 2023 which showed a mildly enlarged left atrium but otherwise good ejection fraction.     Pt was prescribed Phentermine but stopped taking after few doses because he had a dry mouth. I discussed that the dry mouth would go away after being on the medication for a while.  Wife who was on it in the past that she just increased her water intake and did fine with that and lost quite a bit of weight.        Wt Readings from Last 6 Encounters:   02/19/24 192 lb   07/17/23 192 lb 9.6 oz   12/12/22 197 lb   09/16/22 194 lb 12.8 oz   06/02/22 197 lb 12.8 oz   05/16/22 200 lb       BMI Readings from Last 6 Encounters:   02/19/24 30.99 kg/m²   07/17/23 31.09 kg/m²   12/12/22 31.80 kg/m²   09/16/22 31.44 kg/m²   06/02/22 31.93 kg/m²   05/16/22 32.28 kg/m²       BP Readings from Last 6 Encounters:   02/19/24 122/80   07/17/23 115/71   12/12/22 109/73   09/16/22 102/68   06/02/22 117/79   05/16/22 123/78         Review of Systems   Respiratory:  Negative for shortness of breath.     Cardiovascular:  Negative for chest pain.           Medical History:      Past Medical History:   Diagnosis Date    Other and unspecified hyperlipidemia        History reviewed. No pertinent surgical history.       Current Outpatient Medications   Medication Sig Dispense Refill    ergocalciferol 1.25 MG (57072 UT) Oral Cap Take 1 capsule (50,000 Units total) by mouth once a week. 12 capsule 0    Omeprazole 40 MG Oral Capsule Delayed Release Take 1 capsule (40 mg total) by mouth daily. For acid reflux or gastritis. 90 capsule 3    nabumetone 750 MG Oral Tab Take 1 tablet (750 mg total) by mouth 2 (two) times daily. Take with meals. (for pain/inflammation). 180 tablet 1    gemfibrozil 600 MG Oral Tab Take 1 tablet (600 mg total) by mouth 2 (two) times daily before meals. For the triglycerides.  He could not tolerate fenofibrate or niacin ER. 180 tablet 1     Allergies:  Allergies   Allergen Reactions    Niacin HIVES        Physical Exam:       Physical Exam  Blood pressure 122/80, pulse 70, temperature 97.3 °F (36.3 °C), temperature source Temporal, height 5' 6\" (1.676 m), weight 192 lb.      Physical Exam   Constitutional: Patient is oriented to person, place, and time. Patient appears well-developed and well-nourished. No distress.   Head: Normocephalic.   Eyes: Conjunctivae and EOM are normal.   Neck: Normal range of motion. No thyromegaly present.   Cardiovascular: Normal rate, regular rhythm and normal heart sounds.    Pulmonary/Chest: Effort normal and breath sounds normal. No respiratory distress.   Lymphadenopathy: Patient has no cervical adenopathy.  Neurological: Patient is alert and oriented to person, place, and time.   Skin: Skin is warm.   Psychiatry: Normal mood and affect.  Lower legs: No edema of the legs bilaterally.  Abdominal: Normal appearance and bowel sounds are normal. There is no tenderness. There is no rigidity, no rebound, no guarding.      Vitals reviewed.           Assessment/Plan:       Diagnoses and all orders for this visit:    Hyperglyceridemia  -     AST (SGOT); Future  -     ALT(SGPT); Future  -     Lipid Panel; Future  I ordered labs again as we need to see where his cholesterol is along with the sugar and hemoglobin A1c.  That will help us dictate what options we can present to him.  Need for vaccination  -     Zoster Recombinant Adjuvanted (Shingrix -Shingles) [80746]    Epigastric abdominal pain  -     Omeprazole 40 MG Oral Capsule Delayed Release; Take 1 capsule (40 mg total) by mouth daily. For acid reflux or gastritis.  Continue the omeprazole as it seems to really help with his acid reflux and epigastric abdominal discomfort  Fatty liver  Must try to lose weight due to the fatty liver  Hyperglycemia  -     Hemoglobin A1C; Future  -     Basic Metabolic Panel (8); Future  I again reminded him that we do not want him to become diabetic and weight loss is the key.  Screening for colon cancer  -     GASTRO - INTERNAL  Again discussed how important is to get the colonoscopy.  Non morbid obesity due to excess calories  Must cut down on bread, pasta, rice, potatoes, tortillas and decrease portion sizes.  Wife is well aware.      Referrals (if applicable)  Orders Placed This Encounter   Procedures    GASTRO - INTERNAL     Gastroenterology Department phone number is [phone].  ++++ Ask for first available provider that can see you ++++.     Referral Priority:   Routine     Referral Type:   OFFICE VISIT     Requested Specialty:   GASTROENTEROLOGY     Number of Visits Requested:   3       Follow up if symptoms persist.  Take medicine (if given) as prescribed.  Approach to treatment discussed and patient/family member understands and agrees to plan.     No follow-ups on file.    There are no Patient Instructions on file for this visit.    Macario Hewitt    2/19/2024    By signing my name below, I, Macario Hewitt,  attest that this documentation has been prepared under the direction and in the  presence of Anadn Jameson DO.   Electronically Signed: Macario Hewitt, 2/19/2024, 5:28 PM.    I, Anand Jameson DO,  personally performed the services described in this documentation. All medical record entries made by the scribe were at my direction and in my presence.  I have reviewed the chart and discharge instructions (if applicable) and agree that the record reflects my personal performance and is accurate and complete.  Anand Jameson DO, 2/19/2024, 6:02 PM

## 2024-02-24 ENCOUNTER — LAB ENCOUNTER (OUTPATIENT)
Dept: LAB | Age: 49
End: 2024-02-24
Attending: FAMILY MEDICINE
Payer: COMMERCIAL

## 2024-02-24 DIAGNOSIS — R73.9 HYPERGLYCEMIA: ICD-10-CM

## 2024-02-24 DIAGNOSIS — E78.1 HYPERGLYCERIDEMIA: ICD-10-CM

## 2024-02-24 LAB
ALT SERPL-CCNC: 28 U/L
ANION GAP SERPL CALC-SCNC: 5 MMOL/L (ref 0–18)
AST SERPL-CCNC: 25 U/L (ref ?–34)
BUN BLD-MCNC: 12 MG/DL (ref 9–23)
BUN/CREAT SERPL: 12.8 (ref 10–20)
CALCIUM BLD-MCNC: 10.4 MG/DL (ref 8.7–10.4)
CHLORIDE SERPL-SCNC: 106 MMOL/L (ref 98–112)
CHOLEST SERPL-MCNC: 194 MG/DL (ref ?–200)
CO2 SERPL-SCNC: 28 MMOL/L (ref 21–32)
CREAT BLD-MCNC: 0.94 MG/DL
EGFRCR SERPLBLD CKD-EPI 2021: 99 ML/MIN/1.73M2 (ref 60–?)
EST. AVERAGE GLUCOSE BLD GHB EST-MCNC: 131 MG/DL (ref 68–126)
FASTING PATIENT LIPID ANSWER: YES
FASTING STATUS PATIENT QL REPORTED: YES
GLUCOSE BLD-MCNC: 106 MG/DL (ref 70–99)
HBA1C MFR BLD: 6.2 % (ref ?–5.7)
HDLC SERPL-MCNC: 33 MG/DL (ref 40–59)
LDLC SERPL CALC-MCNC: 103 MG/DL (ref ?–100)
NONHDLC SERPL-MCNC: 161 MG/DL (ref ?–130)
OSMOLALITY SERPL CALC.SUM OF ELEC: 288 MOSM/KG (ref 275–295)
POTASSIUM SERPL-SCNC: 4 MMOL/L (ref 3.5–5.1)
SODIUM SERPL-SCNC: 139 MMOL/L (ref 136–145)
TRIGL SERPL-MCNC: 343 MG/DL (ref 30–149)
VLDLC SERPL CALC-MCNC: 59 MG/DL (ref 0–30)

## 2024-02-24 PROCEDURE — 84460 ALANINE AMINO (ALT) (SGPT): CPT

## 2024-02-24 PROCEDURE — 83036 HEMOGLOBIN GLYCOSYLATED A1C: CPT

## 2024-02-24 PROCEDURE — 36415 COLL VENOUS BLD VENIPUNCTURE: CPT

## 2024-02-24 PROCEDURE — 84450 TRANSFERASE (AST) (SGOT): CPT

## 2024-02-24 PROCEDURE — 80061 LIPID PANEL: CPT

## 2024-02-24 PROCEDURE — 80048 BASIC METABOLIC PNL TOTAL CA: CPT

## 2024-05-20 ENCOUNTER — NURSE ONLY (OUTPATIENT)
Dept: FAMILY MEDICINE CLINIC | Facility: CLINIC | Age: 49
End: 2024-05-20

## 2024-05-20 DIAGNOSIS — Z23 NEED FOR VACCINATION: Primary | ICD-10-CM

## 2024-05-20 PROCEDURE — 90750 HZV VACC RECOMBINANT IM: CPT | Performed by: FAMILY MEDICINE

## 2024-05-20 PROCEDURE — 90471 IMMUNIZATION ADMIN: CPT | Performed by: FAMILY MEDICINE

## 2024-05-20 NOTE — PROGRESS NOTES
Patient is here for his second shingrix vaccine. I verified full name, and reason for nurse visit. Patient tolerated injection well and had no questions at this time.

## 2024-10-07 ENCOUNTER — HOSPITAL ENCOUNTER (OUTPATIENT)
Age: 49
Discharge: HOME | End: 2024-10-07
Payer: COMMERCIAL

## 2024-10-07 ENCOUNTER — APPOINTMENT (OUTPATIENT)
Dept: GENERAL RADIOLOGY | Age: 49
End: 2024-10-07
Attending: PHYSICIAN ASSISTANT
Payer: COMMERCIAL

## 2024-10-07 VITALS
OXYGEN SATURATION: 98 % | RESPIRATION RATE: 20 BRPM | TEMPERATURE: 97 F | DIASTOLIC BLOOD PRESSURE: 73 MMHG | SYSTOLIC BLOOD PRESSURE: 135 MMHG | HEART RATE: 84 BPM

## 2024-10-07 DIAGNOSIS — M79.622 LEFT UPPER ARM PAIN: Primary | ICD-10-CM

## 2024-10-07 DIAGNOSIS — R20.2 PARESTHESIAS: ICD-10-CM

## 2024-10-07 PROCEDURE — 99213 OFFICE O/P EST LOW 20 MIN: CPT | Performed by: PHYSICIAN ASSISTANT

## 2024-10-07 PROCEDURE — 73060 X-RAY EXAM OF HUMERUS: CPT | Performed by: PHYSICIAN ASSISTANT

## 2024-10-07 RX ORDER — LIDOCAINE 50 MG/G
1 PATCH TOPICAL EVERY 24 HOURS
Qty: 5 PATCH | Refills: 0 | Status: SHIPPED | OUTPATIENT
Start: 2024-10-07 | End: 2024-10-12

## 2024-10-07 RX ORDER — METHYLPREDNISOLONE 4 MG
TABLET, DOSE PACK ORAL
Qty: 21 TABLET | Refills: 0 | Status: SHIPPED | OUTPATIENT
Start: 2024-10-07

## 2024-10-07 RX ORDER — IBUPROFEN 600 MG/1
TABLET, FILM COATED ORAL
Qty: 20 TABLET | Refills: 0 | Status: SHIPPED | OUTPATIENT
Start: 2024-10-07

## 2024-10-07 NOTE — ED PROVIDER NOTES
Patient Seen in: [facility]    History     Chief Complaint   Patient presents with    Numbness     Stated Complaint: muscle pain/ numbness    HPI    HPI: Juan Alberto Vera is a 49 year old male who presents with chief complaint of left upper arm pain.  Onset 2 months ago.  Patient denies eliciting injury or trauma.  Patient reports associated numbness and tingling sensation of left upper extremity.  Patient denies other injury, head/neck injury or pain, open wound, decreased range of motion, swelling, ecchymosis, erythema, weakness.      Past Medical History:    Other and unspecified hyperlipidemia       History reviewed. No pertinent surgical history.         No family history on file.    Social History     Socioeconomic History    Marital status:    Tobacco Use    Smoking status: Some Days     Types: Cigarettes    Smokeless tobacco: Never   Vaping Use    Vaping status: Never Used   Substance and Sexual Activity    Alcohol use: Yes     Alcohol/week: 4.0 standard drinks of alcohol     Types: 4 Cans of beer per week     Comment: Weekends    Drug use: No   Other Topics Concern    Caffeine Concern Yes     Comment: Coffee, 1 cup daily       Review of Systems    Positive for stated complaint: muscle pain/ numbness  Other systems are as noted in HPI.  Constitutional and vital signs reviewed.      All other systems reviewed and negative except as noted above.    PSFH elements reviewed from today and agreed except as otherwise stated in HPI.    Physical Exam     ED Triage Vitals [10/07/24 1720]   /73   Pulse 84   Resp 20   Temp 97.2 °F (36.2 °C)   Temp src Temporal   SpO2 98 %   O2 Device None (Room air)       Current:/73   Pulse 84   Temp 97.2 °F (36.2 °C) (Temporal)   Resp 20   SpO2 98%     PULSE OX within normal limits on room air as interpreted by this provider.    Physical Exam      Constitutional: The patient is cooperative. Appears well-developed and well-nourished.  Mild  discomfort.  Psychological: Alert, No abnormalities of mood, affect.  Head: Normocephalic/atraumatic.  Eyes: Pupils are equal round reactive to light.  Conjunctiva are within normal limits.  ENT: Oropharynx is clear.  Neck: The neck is supple. No meningeal signs. Trachea normal. Nontender to palpation.  No vertebral point tenderness.  No contusions. No abrasions. No penetrating injury.  Chest: There is no tenderness to the chest wall.  Respiratory: Respiratory effort was normal.  There is no stridor.  Air entry is equal.  Cardiovascular: Regular rate and rhythm.  Capillary refill is brisk.   Genitourinary: Not examined.  Lymphatic: No gross lymphadenopathy noted.  Musculoskeletal: Left upper extremity-Left upper extremity normal to inspection without acute bony deformity.  Positive tenderness to palpation present at left upper arm.  Remainder of left upper extremity nontender to palpation.  Full range of motion of left upper extremity.  Distal pulses intact.  Capillary refill normal.  Motor intact distally.  Sensory intact distally.  No ecchymosis.  No erythema.  No swelling.  No open wounds.  No compartment syndrome.  No edema.  Thoracic and lumbar spine nontender to palpation.  Remainder of musculoskeletal system is grossly intact.  There is no obvious deformity.  Neurological: Gross motor movement is intact in all 4 extremities.  Patient exhibits normal speech.  Skin: Skin is normal to inspection, except as documented.  Warm and dry.  No obvious rash.        ED Course   Labs Reviewed - No data to display    MDM     Differential diagnosis prior to work-up including but not limited to fracture, strain/sprain, contusion, arthritis, radiculopathy    Radiology findings: XR HUMERUS (MIN 2 VIEWS), LEFT (CPT=73060)    Result Date: 10/7/2024  CONCLUSION: No acute fracture or dislocation.  No elbow effusion.  Soft tissues are unremarkable.    elm-remote      Dictated by (CST): J Carlos Leiva MD on 10/07/2024 at 6:32 PM      Finalized by (CST): J Carlos Leiva MD on 10/07/2024 at 6:33 PM           X-ray images of the left humerus independently viewed by this provider-no acute fracture.     Physical exam remained stable as previously documented.  Available results reviewed with patient.    I have given the patient instructions regarding their diagnoses, expectations, follow up, and ER precautions. I explained to the patient that emergent conditions may arise and to go to the ER for new, worsening or any persistent conditions. I've explained the importance of following up with their doctor as instructed. The patient verbalized understanding of the discharge instructions and plan.    Disposition and Plan     Clinical Impression:  1. Left upper arm pain    2. Paresthesias        Disposition:  Discharge    Follow-up:  Anand Jameson DO  [address] [place] [address]  [facility] [address]  [phone]    Call in 1 day  For follow-up    Brooks Muñoz MD  [address] [facility] [address]  [facility] [address]  [phone]    Call in 1 day  For follow-up    Keven Stephens MD  [address] [place] [address]  [address]  [facility] [id number] [phone]    Call in 1 day  For follow-up      Medications Prescribed:  Current Discharge Medication List        START taking these medications    Details   ibuprofen 600 MG Oral Tab Take 1 tablet (600 mg total) by mouth every 6 hours with food  Qty: 20 tablet, Refills: 0      methylPREDNISolone (MEDROL) 4 MG Oral Tablet Therapy Pack Dosepack: take as directed  Qty: 21 tablet, Refills: 0      lidocaine 5 % External Patch Place 1 patch onto the skin daily for 5 days.  Qty: 5 patch, Refills: 0

## 2024-10-07 NOTE — ED INITIAL ASSESSMENT (HPI)
Patient c/o numbness and tingling sensation to left arm x 2 months. States mainly at night. Denies chest pain

## 2024-10-29 ENCOUNTER — OFFICE VISIT (OUTPATIENT)
Dept: FAMILY MEDICINE CLINIC | Facility: CLINIC | Age: 49
End: 2024-10-29

## 2024-10-29 VITALS
WEIGHT: 187 LBS | DIASTOLIC BLOOD PRESSURE: 82 MMHG | BODY MASS INDEX: 30 KG/M2 | SYSTOLIC BLOOD PRESSURE: 127 MMHG | HEART RATE: 77 BPM | TEMPERATURE: 98 F

## 2024-10-29 DIAGNOSIS — R20.2 PARESTHESIA OF LEFT ARM: Primary | ICD-10-CM

## 2024-10-29 DIAGNOSIS — B36.0 TINEA VERSICOLOR: ICD-10-CM

## 2024-10-29 PROCEDURE — 99214 OFFICE O/P EST MOD 30 MIN: CPT | Performed by: FAMILY MEDICINE

## 2024-10-29 PROCEDURE — G2211 COMPLEX E/M VISIT ADD ON: HCPCS | Performed by: FAMILY MEDICINE

## 2024-10-29 PROCEDURE — 3079F DIAST BP 80-89 MM HG: CPT | Performed by: FAMILY MEDICINE

## 2024-10-29 PROCEDURE — 3074F SYST BP LT 130 MM HG: CPT | Performed by: FAMILY MEDICINE

## 2024-10-29 RX ORDER — CELECOXIB 200 MG/1
200 CAPSULE ORAL DAILY
Qty: 90 CAPSULE | Refills: 0 | Status: SHIPPED | OUTPATIENT
Start: 2024-10-29

## 2024-10-29 RX ORDER — GABAPENTIN 100 MG/1
200 CAPSULE ORAL NIGHTLY
Qty: 180 CAPSULE | Refills: 0 | Status: SHIPPED | OUTPATIENT
Start: 2024-10-29

## 2024-10-29 RX ORDER — CLOTRIMAZOLE 1 %
1 CREAM (GRAM) TOPICAL 2 TIMES DAILY
Qty: 28 G | Refills: 0 | Status: SHIPPED | OUTPATIENT
Start: 2024-10-29

## 2024-10-29 NOTE — PROGRESS NOTES
Patient ID: Juan Alberto Vera is a 49 year old male.    HPI  Chief Complaint   Patient presents with    Urgent Care F/u    Numbness     Left arm / wrist      Last seen on 02/19/2024.     Pt is here with his wife. He works as a .      Pt went to immediate care on 10/07/2024 for left upper arm pain that causes numbness down the arm and into the hand. I reviewed the note and XR. This began 2 months ago.He wakes up from sleep with tingling in the hand. Pt points to the radial aspect of the palm as the more severe paresthesia. It is hard to use his phone and drive his car because of the tingling. Pt doesn't have symptoms while he is at work, only at night and in the morning. He is right handed. Pt denies problems in his neck. He has a Velcro wrist brace that he has been using without success as his wife had carpal tunnel syndrome and had to go through an EMG/NCV in the past. I discussed with them.     Pt also wishes to consult on lesions on his shoulders for the last 5 months. I discussed with them.  They are not painful or itchy      Wt Readings from Last 6 Encounters:   10/29/24 187 lb   02/19/24 192 lb   07/17/23 192 lb 9.6 oz   12/12/22 197 lb   09/16/22 194 lb 12.8 oz   06/02/22 197 lb 12.8 oz       BMI Readings from Last 6 Encounters:   10/29/24 30.18 kg/m²   02/19/24 30.99 kg/m²   07/17/23 31.09 kg/m²   12/12/22 31.80 kg/m²   09/16/22 31.44 kg/m²   06/02/22 31.93 kg/m²       BP Readings from Last 6 Encounters:   10/29/24 127/82   10/07/24 135/73   02/19/24 122/80   07/17/23 115/71   12/12/22 109/73   09/16/22 102/68         Review of Systems   Respiratory:  Negative for shortness of breath.    Cardiovascular:  Negative for chest pain.   Skin:  Positive for rash.   Neurological:  Positive for numbness.           Medical History:      Past Medical History:    Other and unspecified hyperlipidemia       History reviewed. No pertinent surgical history.       Current Outpatient Medications    Medication Sig Dispense Refill                  Omeprazole 40 MG Oral Capsule Delayed Release Take 1 capsule (40 mg total) by mouth daily. For acid reflux or gastritis. 90 capsule 3    ergocalciferol 1.25 MG (99081 UT) Oral Cap Take 1 capsule (50,000 Units total) by mouth once a week. 12 capsule 0           gemfibrozil 600 MG Oral Tab Take 1 tablet (600 mg total) by mouth 2 (two) times daily before meals. For the triglycerides.  He could not tolerate fenofibrate or niacin ER. 180 tablet 1     Allergies:Allergies[1]     Physical Exam:       Physical Exam  Blood pressure 127/82, pulse 77, temperature 97.5 °F (36.4 °C), temperature source Temporal, weight 187 lb.      Physical Exam   Constitutional: Patient is oriented to person, place, and time. Patient appears well-developed and well-nourished. No distress.   Head: Normocephalic.   NECK: Full range of motion of the neck.  No tenderness.  Negative Spurling sign.  Neurological: Patient is alert and oriented to person, place, and time. Negative Tinel's left wrist. No hand atrophy. DTR 2/4 BUE and BLE. Upper extremity strength is full. Sensation is good  Skin: Skin is warm. 1.5 cm dry flaky circular lesion on the left trapezius area. 9 mm dry flaky circular lesion on the right trapezius area.   Psychiatry: Normal mood and affect.    Vitals reviewed.           Assessment/Plan:      Diagnoses and all orders for this visit:    Paresthesia of left arm  -     EMG  -     celecoxib (CELEBREX) 200 MG Oral Cap; Take 1 capsule (200 mg total) by mouth daily. Take with food. This is for pain and inflammation.  -     gabapentin 100 MG Oral Cap; Take 2 capsules (200 mg total) by mouth nightly. For pain and discomfort due to possible carpal tunnel syndrome on the left side .  Start Celebrex daily.  Gabapentin at nighttime.  Needs an EMG/NCV and we will get this set up.  Continue wearing the Velcro wrist brace.  Taught him some stretching exercises to do.  Tinea versicolor  -      clotrimazole 1 % External Cream; Apply 1 Application topically 2 (two) times daily. For the rash on the neck.  Do this for 3 to 4 weeks  Lets try clotrimazole cream.      Referrals (if applicable)  No orders of the defined types were placed in this encounter.      Follow up if symptoms persist.  Take medicine (if given) as prescribed.  Approach to treatment discussed and patient/family member understands and agrees to plan.     No follow-ups on file.    There are no Patient Instructions on file for this visit.    Shanta Lebron    10/29/2024    By signing my name below, IShanta,  attest that this documentation has been prepared under the direction and in the presence of Anand Jameson DO.   Electronically Signed: Shanta Lebron, 10/29/2024, 11:41 AM.    I, Anand Jameson DO,  personally performed the services described in this documentation. All medical record entries made by the scribe were at my direction and in my presence.  I have reviewed the chart and discharge instructions (if applicable) and agree that the record reflects my personal performance and is accurate and complete.  Anand Jameson DO, 10/29/2024, 12:21 PM                  [1]   Allergies  Allergen Reactions    Niacin HIVES

## (undated) NOTE — LETTER
[facility]  [address]  [phone]            Patient: Jennifer Ruff   YOB: 1975   Date of Visit: 12/10/2020       [facility],       December 10, 2020      [address] ? Rose pasado al menos 10 días (20 días para las personas con ryan enfermedad grave que requirió hospitalización) desde que iniciaron los síntomas y  ?  Rose pasado al menos 24 horas desde que se redujo la fiebre sin el uso de medicamentos para bajar la fiebre

## (undated) NOTE — LETTER
May 15, 2019     [address] [place]      Dear Selvin Scales:    Below are the results from your recent visit: Pulmonary function test of your lungs is normal.  Best thing you can do is do not smoke at all.

## (undated) NOTE — LETTER
11/27/2018              [address] [place], [place] [address]ave [facility] [address]         To whom it may concern,    Mery Solo is currently a patient under my medical care. Patient was seen today.   Unfortunately he santiago

## (undated) NOTE — LETTER
[facility]  [address]  [phone]            Patient: Selma Terry   YOB: 1975   Date of Visit: 12/10/2020       [facility],       December 10, 2020      [address] ? Rose pasado al menos 10 días (20 días para las personas con ryan enfermedad grave que requirió hospitalización) desde que iniciaron los síntomas y  ?  Rose pasado al menos 24 horas desde que se redujo la fiebre sin el uso de medicamentos para bajar la fiebre

## (undated) NOTE — LETTER
01/28/21        [id number]  [place] [address]      Dear Brianda Crowell,    [address] records indicate that you have outstanding lab work and or testing that was ordered for you and has not yet been completed:  Orders Placed This

## (undated) NOTE — LETTER
12/20/2017          To Whom It May Concern:    Favio Guillen is currently under my medical care and may not return to work at this time. Please excuse Onel Mayorga for 2 days. He may return to work on 12/22/17.   Activity is restricted as follows: